# Patient Record
Sex: MALE | Race: WHITE | NOT HISPANIC OR LATINO | Employment: FULL TIME | URBAN - METROPOLITAN AREA
[De-identification: names, ages, dates, MRNs, and addresses within clinical notes are randomized per-mention and may not be internally consistent; named-entity substitution may affect disease eponyms.]

---

## 2021-05-25 ENCOUNTER — RECORDS - HEALTHEAST (OUTPATIENT)
Dept: ADMINISTRATIVE | Facility: CLINIC | Age: 36
End: 2021-05-25

## 2021-05-26 ENCOUNTER — RECORDS - HEALTHEAST (OUTPATIENT)
Dept: ADMINISTRATIVE | Facility: CLINIC | Age: 36
End: 2021-05-26

## 2021-05-28 ENCOUNTER — COMMUNICATION - HEALTHEAST (OUTPATIENT)
Dept: SCHEDULING | Facility: CLINIC | Age: 36
End: 2021-05-28

## 2022-06-13 ENCOUNTER — HOSPITAL ENCOUNTER (EMERGENCY)
Facility: CLINIC | Age: 37
Discharge: HOME | End: 2022-06-13
Attending: EMERGENCY MEDICINE | Admitting: EMERGENCY MEDICINE
Payer: COMMERCIAL

## 2022-06-13 VITALS
BODY MASS INDEX: 25.77 KG/M2 | HEART RATE: 74 BPM | RESPIRATION RATE: 20 BRPM | DIASTOLIC BLOOD PRESSURE: 76 MMHG | HEIGHT: 70 IN | WEIGHT: 180 LBS | SYSTOLIC BLOOD PRESSURE: 121 MMHG | OXYGEN SATURATION: 99 % | TEMPERATURE: 97.4 F

## 2022-06-13 DIAGNOSIS — K51.911 ULCERATIVE COLITIS WITH RECTAL BLEEDING, UNSPECIFIED LOCATION (H): ICD-10-CM

## 2022-06-13 DIAGNOSIS — K62.5 RECTAL BLEEDING: ICD-10-CM

## 2022-06-13 LAB
ABO/RH(D): ABNORMAL
ANION GAP SERPL CALCULATED.3IONS-SCNC: 10 MMOL/L (ref 5–18)
ANTIBODY SCREEN: POSITIVE
ANTIBODY UNIDENTIFIED: NORMAL
BASOPHILS # BLD AUTO: 0.1 10E3/UL (ref 0–0.2)
BASOPHILS NFR BLD AUTO: 1 %
BUN SERPL-MCNC: 14 MG/DL (ref 8–22)
CALCIUM SERPL-MCNC: 9.5 MG/DL (ref 8.5–10.5)
CHLORIDE BLD-SCNC: 104 MMOL/L (ref 98–107)
CO2 SERPL-SCNC: 25 MMOL/L (ref 22–31)
CREAT SERPL-MCNC: 1.34 MG/DL (ref 0.7–1.3)
EOSINOPHIL # BLD AUTO: 0.3 10E3/UL (ref 0–0.7)
EOSINOPHIL NFR BLD AUTO: 2 %
ERYTHROCYTE [DISTWIDTH] IN BLOOD BY AUTOMATED COUNT: 12.7 % (ref 10–15)
GFR SERPL CREATININE-BSD FRML MDRD: 71 ML/MIN/1.73M2
GLUCOSE BLD-MCNC: 97 MG/DL (ref 70–125)
HCT VFR BLD AUTO: 46 % (ref 40–53)
HGB BLD-MCNC: 15.5 G/DL (ref 13.3–17.7)
IMM GRANULOCYTES # BLD: 0.1 10E3/UL
IMM GRANULOCYTES NFR BLD: 1 %
INR PPP: 1.12 (ref 0.85–1.15)
LYMPHOCYTES # BLD AUTO: 2.2 10E3/UL (ref 0.8–5.3)
LYMPHOCYTES NFR BLD AUTO: 13 %
MCH RBC QN AUTO: 27.1 PG (ref 26.5–33)
MCHC RBC AUTO-ENTMCNC: 33.7 G/DL (ref 31.5–36.5)
MCV RBC AUTO: 80 FL (ref 78–100)
MONOCYTES # BLD AUTO: 1.4 10E3/UL (ref 0–1.3)
MONOCYTES NFR BLD AUTO: 8 %
NEUTROPHILS # BLD AUTO: 13.4 10E3/UL (ref 1.6–8.3)
NEUTROPHILS NFR BLD AUTO: 75 %
NRBC # BLD AUTO: 0 10E3/UL
NRBC BLD AUTO-RTO: 0 /100
PLATELET # BLD AUTO: 367 10E3/UL (ref 150–450)
POTASSIUM BLD-SCNC: 3.9 MMOL/L (ref 3.5–5)
RBC # BLD AUTO: 5.73 10E6/UL (ref 4.4–5.9)
SODIUM SERPL-SCNC: 139 MMOL/L (ref 136–145)
SPECIMEN EXPIRATION DATE: ABNORMAL
SPECIMEN EXPIRATION DATE: NORMAL
WBC # BLD AUTO: 17.5 10E3/UL (ref 4–11)

## 2022-06-13 PROCEDURE — 86870 RBC ANTIBODY IDENTIFICATION: CPT | Performed by: EMERGENCY MEDICINE

## 2022-06-13 PROCEDURE — 99283 EMERGENCY DEPT VISIT LOW MDM: CPT

## 2022-06-13 PROCEDURE — 86920 COMPATIBILITY TEST SPIN: CPT | Performed by: EMERGENCY MEDICINE

## 2022-06-13 PROCEDURE — 84999 UNLISTED CHEMISTRY PROCEDURE: CPT | Performed by: EMERGENCY MEDICINE

## 2022-06-13 PROCEDURE — 86922 COMPATIBILITY TEST ANTIGLOB: CPT | Performed by: EMERGENCY MEDICINE

## 2022-06-13 PROCEDURE — 85610 PROTHROMBIN TIME: CPT | Performed by: EMERGENCY MEDICINE

## 2022-06-13 PROCEDURE — 81403 MOPATH PROCEDURE LEVEL 4: CPT | Performed by: EMERGENCY MEDICINE

## 2022-06-13 PROCEDURE — 86901 BLOOD TYPING SEROLOGIC RH(D): CPT | Performed by: EMERGENCY MEDICINE

## 2022-06-13 PROCEDURE — 80048 BASIC METABOLIC PNL TOTAL CA: CPT | Performed by: EMERGENCY MEDICINE

## 2022-06-13 PROCEDURE — 86921 COMPATIBILITY TEST INCUBATE: CPT | Performed by: EMERGENCY MEDICINE

## 2022-06-13 PROCEDURE — 85025 COMPLETE CBC W/AUTO DIFF WBC: CPT | Performed by: EMERGENCY MEDICINE

## 2022-06-13 PROCEDURE — 84999 UNLISTED CHEMISTRY PROCEDURE: CPT | Mod: 59 | Performed by: EMERGENCY MEDICINE

## 2022-06-13 PROCEDURE — 86860 RBC ANTIBODY ELUTION: CPT | Performed by: EMERGENCY MEDICINE

## 2022-06-13 PROCEDURE — 86880 COOMBS TEST DIRECT: CPT | Performed by: EMERGENCY MEDICINE

## 2022-06-13 PROCEDURE — 82310 ASSAY OF CALCIUM: CPT | Performed by: EMERGENCY MEDICINE

## 2022-06-13 PROCEDURE — 36415 COLL VENOUS BLD VENIPUNCTURE: CPT | Performed by: EMERGENCY MEDICINE

## 2022-06-13 PROCEDURE — 86850 RBC ANTIBODY SCREEN: CPT | Performed by: EMERGENCY MEDICINE

## 2022-06-13 PROCEDURE — 86900 BLOOD TYPING SEROLOGIC ABO: CPT | Mod: 59 | Performed by: EMERGENCY MEDICINE

## 2022-06-13 NOTE — DISCHARGE INSTRUCTIONS
Continue to monitor your stools for the next 24 hours.  If the bleeding worsens or continues call your GI specialist to discuss further treatment for your ulcerative colitis.  Return prior to that if you have any significant fevers, worsening abdominal pain or uncontrolled bleeding.

## 2022-06-13 NOTE — ED PROVIDER NOTES
EMERGENCY DEPARTMENT ENCOUNTER      NAME: Michael Mo  AGE: 36 year old male  YOB: 1985  MRN: [id number]  EVALUATION DATE & TIME: 6/13/2022  4:54 PM    PCP: No primary care provider on file.    ED PROVIDER: Jm Cordova M.D.      Chief Complaint   Patient presents with     Rectal Bleeding         FINAL IMPRESSION:  Rectal bleeding      ED COURSE & MEDICAL DECISION MAKING:    Pertinent Labs & Imaging studies reviewed. (See chart for details)  36 year old male presents to the Emergency Department for evaluation of rectal bleeding.  Patient reports moderately bloody stool earlier today but none for the last few hours since arrival in the emergency room.  Patient with a history of ulcerative colitis.  Denies any recent discomfort or flaring of his symptoms.  Exam is benign.  Abdomen soft and nontender.  Vital signs normal.  Laboratory evaluation obtained from triage is normal.  Patient could be experiencing minimal flare of his ulcerative colitis.  Certainly no evidence of significant pathology presently.  Patient encouraged to remain on his present medications follow-up with his GI specialist if he continues to have some GI bleeding.  Understands to return immediately for any significant bleeding, abdominal pain or fevers with to suggest significant pathology.  Patient appears non toxic with stable vitals signs. Overall exam is benign.        5:22 PM I met with the patient for the initial interview and physical examination. Discussed plan for treatment and workup in the ED.      At the conclusion of the encounter I discussed the results of all of the tests and the disposition. The questions were answered and return precautions provided. The patient or family acknowledged understanding and was agreeable with the care plan.       PPE: Provider wore gloves, N95 mask, eye protection, surgical cap.     MEDICATIONS GIVEN IN THE EMERGENCY:  Medications - No data to display    NEW PRESCRIPTIONS STARTED AT  "TODAY'S ER VISIT  New Prescriptions    No medications on file          =================================================================    HPI    Patient information was obtained from: Patient    Use of Intrepreter: N/A       Michael Mo is a 36 year old male with a pertient medical history of rectal bleeding who presents to the ED for evaluation of rectal bleeding.     Per chart review: On 6/11/2022 at the [facility], patient presented with a skin lesion on his rectum. Patient has had a pilonidal cyst in the past which was drained in 2016. Has a history of ulcerative colitis, but doesn't believe his symptoms are related to that. Patient has a pilonidal cyst without surrounding cellulitis. Incision and drainage preformed. Recommended a course of doxycycline and warm compresses. Discussed that today's procedure is not treatment for this cyst and he will need surgical follow up to have it removed. Discharged with prescription for doxycycline.     On 6/11/2022 (~2 days ago), patient was seen for a pilonidal cyst where they prescribed him doxycycline. Shortly after taking the medication patient started experiencing \"bleeding/mucus/pus\" in his stool, so he stopped taking it this morning. Patient endorses abdominal tenderness since Saturday. States currently he is having no abdominal tenderness, and no bleeding.     Of note, patient's GI did a colonoscopy and a biopsy and diagnosed patient with ulcerative colitis. Patient was placed on 800 mg mesalamine, three in the morning, and three in the evening.     Patient denies any other complaints at this time.     REVIEW OF SYSTEMS   Constitutional:  Denies fever, chills  Respiratory:  Denies productive cough or increased work of breathing  Cardiovascular:  Denies chest pain, palpitations  GI:  Denies nausea, vomiting, or change in bladder habits. Positive for blood/mucus/pus in stool (resolved). Positive for abdominal tenderness (resolved).   Musculoskeletal:  " "Denies any new muscle/joint swelling  Skin:  Denies rash   Neurologic:  Denies focal weakness  All systems negative except as marked.     PAST MEDICAL HISTORY:  History reviewed. No pertinent past medical history.    PAST SURGICAL HISTORY:  History reviewed. No pertinent surgical history.      CURRENT MEDICATIONS:    No current facility-administered medications for this encounter.  No current outpatient medications on file.    ALLERGIES:  No Known Allergies    FAMILY HISTORY:  History reviewed. No pertinent family history.    SOCIAL HISTORY:   Social History     Socioeconomic History     Marital status: Single     Spouse name: None     Number of children: None     Years of education: None     Highest education level: None       VITALS:  Patient Vitals for the past 24 hrs:   BP Temp Temp src Pulse Resp SpO2 Height Weight   06/13/22 1715 119/73 -- -- 73 -- 98 % -- --   06/13/22 1700 129/86 -- -- 74 -- 98 % -- --   06/13/22 1330 135/81 97.4  F (36.3  C) Oral 80 20 99 % 1.778 m (5' 10\") 81.6 kg (180 lb)        PHYSICAL EXAM    Constitutional:  Awake, alert, in no apparent distress  HENT:  Normocephalic, Atraumatic. Bilateral external ears normal. Oropharynx moist. Nose normal. Neck- Normal range of motion with no guarding, No midline cervical tenderness, Supple, No stridor.   Eyes:  PERRL, EOMI with no signs of entrapment, Conjunctiva normal, No discharge.   Respiratory:  Normal breath sounds, No respiratory distress, No wheezing.    Cardiovascular:  Normal heart rate, Normal rhythm, No appreciable rubs or gallops.   GI:  Soft, No tenderness, No distension, No palpable masses. Hyperactive bowel sounds.   Musculoskeletal:   No edema. Good range of motion in all major joints. No tenderness to palpation or major deformities noted.  Integument:  Warm, Dry, No erythema, No rash.   Neurologic:  Alert & oriented, Normal motor function, Normal sensory function, No focal deficits noted.   Psychiatric:  Affect normal, Judgment " normal, Mood normal.     LAB:  All pertinent labs reviewed and interpreted.  Results for orders placed or performed during the hospital encounter of 06/13/22   Result Value Ref Range    INR 1.12 0.85 - 1.15   Basic metabolic panel   Result Value Ref Range    Sodium 139 136 - 145 mmol/L    Potassium 3.9 3.5 - 5.0 mmol/L    Chloride 104 98 - 107 mmol/L    Carbon Dioxide (CO2) 25 22 - 31 mmol/L    Anion Gap 10 5 - 18 mmol/L    Urea Nitrogen 14 8 - 22 mg/dL    Creatinine 1.34 (H) 0.70 - 1.30 mg/dL    Calcium 9.5 8.5 - 10.5 mg/dL    Glucose 97 70 - 125 mg/dL    GFR Estimate 71 >60 mL/min/1.73m2   CBC with platelets and differential   Result Value Ref Range    WBC Count 17.5 (H) 4.0 - 11.0 10e3/uL    RBC Count 5.73 4.40 - 5.90 10e6/uL    Hemoglobin 15.5 13.3 - 17.7 g/dL    Hematocrit 46.0 40.0 - 53.0 %    MCV 80 78 - 100 fL    MCH 27.1 26.5 - 33.0 pg    MCHC 33.7 31.5 - 36.5 g/dL    RDW 12.7 10.0 - 15.0 %    Platelet Count 367 150 - 450 10e3/uL    % Neutrophils 75 %    % Lymphocytes 13 %    % Monocytes 8 %    % Eosinophils 2 %    % Basophils 1 %    % Immature Granulocytes 1 %    NRBCs per 100 WBC 0 <1 /100    Absolute Neutrophils 13.4 (H) 1.6 - 8.3 10e3/uL    Absolute Lymphocytes 2.2 0.8 - 5.3 10e3/uL    Absolute Monocytes 1.4 (H) 0.0 - 1.3 10e3/uL    Absolute Eosinophils 0.3 0.0 - 0.7 10e3/uL    Absolute Basophils 0.1 0.0 - 0.2 10e3/uL    Absolute Immature Granulocytes 0.1 <=0.4 10e3/uL    Absolute NRBCs 0.0 10e3/uL   Adult Type and Screen   Result Value Ref Range    ABO/RH(D) B POS     Antibody Screen Positive (A) Negative    SPECIMEN EXPIRATION DATE [id number]    Antibody identification   Result Value Ref Range    ANTIBODY UNIDENTIFIED REFERRED FOR I.D.     SPECIMEN EXPIRATION DATE [id number]        RADIOLOGY:  Reviewed all pertinent imaging. Please see official radiology report.  No orders to display         I, Jackie Marie, am serving as a scribe to document services personally performed by Jm Cordova MD,  based on my observation and the provider's statements to me. I, Jm Cordova MD attest that Jackie Romanokorski is acting in a scribe capacity, has observed my performance of the services and has documented them in accordance with my direction.    Jm Cordova M.D.  Emergency Medicine  [facility] EMERGENCY ROOM     Jm Cordova MD  06/13/22 3682       Jm Cordova MD  06/14/22 0574

## 2022-06-14 ENCOUNTER — HOSPITAL ENCOUNTER (EMERGENCY)
Facility: CLINIC | Age: 37
Discharge: HOME | End: 2022-06-15
Attending: STUDENT IN AN ORGANIZED HEALTH CARE EDUCATION/TRAINING PROGRAM | Admitting: STUDENT IN AN ORGANIZED HEALTH CARE EDUCATION/TRAINING PROGRAM
Payer: COMMERCIAL

## 2022-06-14 DIAGNOSIS — K62.5 RECTAL BLEEDING: ICD-10-CM

## 2022-06-14 LAB
ALBUMIN SERPL-MCNC: 3.6 G/DL (ref 3.5–5)
ALP SERPL-CCNC: 113 U/L (ref 45–120)
ALT SERPL W P-5'-P-CCNC: 15 U/L (ref 0–45)
ANION GAP SERPL CALCULATED.3IONS-SCNC: 8 MMOL/L (ref 5–18)
AST SERPL W P-5'-P-CCNC: 18 U/L (ref 0–40)
BASOPHILS # BLD AUTO: 0.1 10E3/UL (ref 0–0.2)
BASOPHILS NFR BLD AUTO: 1 %
BILIRUB DIRECT SERPL-MCNC: 0.1 MG/DL
BILIRUB SERPL-MCNC: 0.4 MG/DL (ref 0–1)
BUN SERPL-MCNC: 14 MG/DL (ref 8–22)
CALCIUM SERPL-MCNC: 8.9 MG/DL (ref 8.5–10.5)
CHLORIDE BLD-SCNC: 106 MMOL/L (ref 98–107)
CO2 SERPL-SCNC: 24 MMOL/L (ref 22–31)
CREAT SERPL-MCNC: 1.27 MG/DL (ref 0.7–1.3)
EOSINOPHIL # BLD AUTO: 0.7 10E3/UL (ref 0–0.7)
EOSINOPHIL NFR BLD AUTO: 4 %
ERYTHROCYTE [DISTWIDTH] IN BLOOD BY AUTOMATED COUNT: 12.5 % (ref 10–15)
GFR SERPL CREATININE-BSD FRML MDRD: 76 ML/MIN/1.73M2
GLUCOSE BLD-MCNC: 98 MG/DL (ref 70–125)
HCT VFR BLD AUTO: 45.3 % (ref 40–53)
HGB BLD-MCNC: 15.1 G/DL (ref 13.3–17.7)
HOLD SPECIMEN: NORMAL
IMM GRANULOCYTES # BLD: 0.1 10E3/UL
IMM GRANULOCYTES NFR BLD: 1 %
LYMPHOCYTES # BLD AUTO: 2.8 10E3/UL (ref 0.8–5.3)
LYMPHOCYTES NFR BLD AUTO: 15 %
MCH RBC QN AUTO: 26.5 PG (ref 26.5–33)
MCHC RBC AUTO-ENTMCNC: 33.3 G/DL (ref 31.5–36.5)
MCV RBC AUTO: 80 FL (ref 78–100)
MONOCYTES # BLD AUTO: 1.5 10E3/UL (ref 0–1.3)
MONOCYTES NFR BLD AUTO: 8 %
NEUTROPHILS # BLD AUTO: 13.9 10E3/UL (ref 1.6–8.3)
NEUTROPHILS NFR BLD AUTO: 71 %
NRBC # BLD AUTO: 0 10E3/UL
NRBC BLD AUTO-RTO: 0 /100
PLATELET # BLD AUTO: 361 10E3/UL (ref 150–450)
POTASSIUM BLD-SCNC: 3.7 MMOL/L (ref 3.5–5)
PROT SERPL-MCNC: 7.7 G/DL (ref 6–8)
RBC # BLD AUTO: 5.69 10E6/UL (ref 4.4–5.9)
SODIUM SERPL-SCNC: 138 MMOL/L (ref 136–145)
WBC # BLD AUTO: 19 10E3/UL (ref 4–11)

## 2022-06-14 PROCEDURE — 80048 BASIC METABOLIC PNL TOTAL CA: CPT | Performed by: EMERGENCY MEDICINE

## 2022-06-14 PROCEDURE — 99283 EMERGENCY DEPT VISIT LOW MDM: CPT

## 2022-06-14 PROCEDURE — 82248 BILIRUBIN DIRECT: CPT | Performed by: EMERGENCY MEDICINE

## 2022-06-14 PROCEDURE — 85025 COMPLETE CBC W/AUTO DIFF WBC: CPT | Performed by: EMERGENCY MEDICINE

## 2022-06-14 PROCEDURE — 36415 COLL VENOUS BLD VENIPUNCTURE: CPT | Performed by: EMERGENCY MEDICINE

## 2022-06-15 VITALS
WEIGHT: 180 LBS | SYSTOLIC BLOOD PRESSURE: 131 MMHG | RESPIRATION RATE: 18 BRPM | DIASTOLIC BLOOD PRESSURE: 70 MMHG | HEART RATE: 90 BPM | HEIGHT: 70 IN | OXYGEN SATURATION: 99 % | BODY MASS INDEX: 25.77 KG/M2 | TEMPERATURE: 98.5 F

## 2022-06-15 PROCEDURE — 250N000012 HC RX MED GY IP 250 OP 636 PS 637: Performed by: STUDENT IN AN ORGANIZED HEALTH CARE EDUCATION/TRAINING PROGRAM

## 2022-06-15 PROCEDURE — 258N000003 HC RX IP 258 OP 636: Performed by: STUDENT IN AN ORGANIZED HEALTH CARE EDUCATION/TRAINING PROGRAM

## 2022-06-15 RX ORDER — PREDNISONE 20 MG/1
40 TABLET ORAL ONCE
Status: COMPLETED | OUTPATIENT
Start: 2022-06-15 | End: 2022-06-15

## 2022-06-15 RX ORDER — PREDNISONE 20 MG/1
TABLET ORAL
Qty: 30 TABLET | Refills: 0 | Status: SHIPPED | OUTPATIENT
Start: 2022-06-15 | End: 2022-07-09

## 2022-06-15 RX ADMIN — SODIUM CHLORIDE, POTASSIUM CHLORIDE, SODIUM LACTATE AND CALCIUM CHLORIDE 1000 ML: 600; 310; 30; 20 INJECTION, SOLUTION INTRAVENOUS at 00:25

## 2022-06-15 RX ADMIN — PREDNISONE 40 MG: 20 TABLET ORAL at 00:26

## 2022-06-15 NOTE — DISCHARGE INSTRUCTIONS
As we discussed, please call your GI doctor for follow-up appointment.  Please take the first 2 weeks of the steroids.  Return for any lightheadedness or dizziness, difficulty tolerating eating or drinking, increasing abdominal pain, fevers or any other concerning symptoms.

## 2022-06-15 NOTE — ED PROVIDER NOTES
Emergency Department Encounter         FINAL IMPRESSION:  Rectal bleeding        ED COURSE AND MEDICAL DECISION MAKING          36-year-old history of ulcerative colitis here with less than 12 hours of rectal bleeding with concerns of a possible colitis flare.  Has only had 1 other flare in his life that was treated successfully with oral glucocorticoids.  No fevers, chills, nausea or vomiting.  No chest pain or trouble breathing.  Normal urination.    Arrival his vitals are stable.  Also clinically.  Heart and lungs normal.  No significant abdominal pain to palpation.    - Labs x-ray showing white count.  No fever here.  No significant pain to palpation which would go against any abscess or rupture process.  - We will give him fluids as he states he feels dry peer after fluids we will give him a oral dose of prednisone and discharged home with a prednisone taper.  - Patient sent home with prednisone taper.  Has an appointment GI next week.                             At the conclusion of the encounter I discussed the results of all the tests and the disposition. The questions were answered. The patient or family acknowledged understanding and was agreeable with the care plan.                  MEDICATIONS GIVEN IN THE EMERGENCY DEPARTMENT:  Medications   lactated ringers BOLUS 1,000 mL (has no administration in time range)   predniSONE (DELTASONE) tablet 40 mg (has no administration in time range)       NEW PRESCRIPTIONS STARTED AT TODAY'S ED VISIT:  New Prescriptions    No medications on file       HPI     36 with history of ulcerative colitis here with 4 hours of rectal bleeding and diarrhea.  Mild abdominal cramping with no pain.  No nausea vomiting fevers or chest pain.  No trouble breathing.      REVIEW OF SYSTEMS:  Review of Systems   Constitutional: Negative for fever, malaise  HEENT: Negative runny nose, sore throat, ear pain, neck pain  Respiratory: Negative for shortness of breath, cough,  "congestion  Cardiovascular: Negative for chest pain, leg edema  Gastrointestinal: Diarrhea, hematochezia  Genitourinary: Negative for dysuria and hematuria.   Integument: Negative for rash, skin breakdown  Neurological: Negative for paresthesias, weakness, headache.  Musculoskeletal: Negative for joint pain, joint swelling      All other systems reviewed and are negative.          MEDICAL HISTORY     No past medical history on file.    No past surgical history on file.         No current outpatient medications on file.          PHYSICAL EXAM     BP (!) 124/92   Pulse 90   Temp 98.5  F (36.9  C) (Oral)   Resp 18   Ht 1.778 m (5' 10\")   Wt 81.6 kg (180 lb)   SpO2 96%   BMI 25.83 kg/m        PHYSICAL EXAM:     General: Patient appears well, nontoxic, comfortable  HEENT: Moist mucous membranes, no tongue swelling.  No head trauma.  No midline neck pain.  Cardiovascular: Normal rate, normal rhythm, no extremity edema.  No appreciable murmur.  Respiratory: No signs of respiratory distress, lungs are clear to auscultation bilaterally with no wheezes rhonchi or rales.  Abdominal: Soft, nontender, nondistended, no palpable masses, no guarding, no rebound  Musculoskeletal: Full range of motion of joints, no deformities appreciated.  Neurological: Alert and oriented, grossly neurologically intact.  Psychological: Normal affect and mood.  Integument: No rashes appreciated          RESULTS       Labs Ordered and Resulted from Time of ED Arrival to Time of ED Departure   CBC WITH PLATELETS AND DIFFERENTIAL - Abnormal       Result Value    WBC Count 19.0 (*)     RBC Count 5.69      Hemoglobin 15.1      Hematocrit 45.3      MCV 80      MCH 26.5      MCHC 33.3      RDW 12.5      Platelet Count 361      % Neutrophils 71      % Lymphocytes 15      % Monocytes 8      % Eosinophils 4      % Basophils 1      % Immature Granulocytes 1      NRBCs per 100 WBC 0      Absolute Neutrophils 13.9 (*)     Absolute Lymphocytes 2.8      " Absolute Monocytes 1.5 (*)     Absolute Eosinophils 0.7      Absolute Basophils 0.1      Absolute Immature Granulocytes 0.1      Absolute NRBCs 0.0     BASIC METABOLIC PANEL - Normal    Sodium 138      Potassium 3.7      Chloride 106      Carbon Dioxide (CO2) 24      Anion Gap 8      Urea Nitrogen 14      Creatinine 1.27      Calcium 8.9      Glucose 98      GFR Estimate 76     HEPATIC FUNCTION PANEL - Normal    Bilirubin Total 0.4      Bilirubin Direct 0.1      Protein Total 7.7      Albumin 3.6      Alkaline Phosphatase 113      AST 18      ALT 15         No orders to display                     PROCEDURES:  Procedures:  Procedures       Calvin Deluca DO  Emergency Medicine  [facility] EMERGENCY ROOM     Calvin Deluca DO  06/15/22 0127

## 2022-06-18 LAB
Lab: NORMAL
PERFORMING LABORATORY: NORMAL
SCANNED LAB RESULT: NORMAL
TEST NAME: NORMAL

## 2022-10-01 ENCOUNTER — HEALTH MAINTENANCE LETTER (OUTPATIENT)
Age: 37
End: 2022-10-01

## 2023-07-08 ENCOUNTER — HOSPITAL ENCOUNTER (EMERGENCY)
Facility: CLINIC | Age: 38
Discharge: HOME | End: 2023-07-08
Attending: EMERGENCY MEDICINE | Admitting: EMERGENCY MEDICINE
Payer: COMMERCIAL

## 2023-07-08 ENCOUNTER — APPOINTMENT (OUTPATIENT)
Dept: CT IMAGING | Facility: CLINIC | Age: 38
End: 2023-07-08
Attending: EMERGENCY MEDICINE
Payer: COMMERCIAL

## 2023-07-08 VITALS
SYSTOLIC BLOOD PRESSURE: 111 MMHG | HEIGHT: 70 IN | WEIGHT: 175 LBS | OXYGEN SATURATION: 100 % | BODY MASS INDEX: 25.05 KG/M2 | DIASTOLIC BLOOD PRESSURE: 66 MMHG | HEART RATE: 76 BPM | RESPIRATION RATE: 18 BRPM

## 2023-07-08 DIAGNOSIS — K52.9 NON-SPECIFIC COLITIS: ICD-10-CM

## 2023-07-08 LAB
ALBUMIN SERPL-MCNC: 3.8 G/DL (ref 3.5–5)
ALP SERPL-CCNC: 76 U/L (ref 45–120)
ALT SERPL W P-5'-P-CCNC: 12 U/L (ref 0–45)
ANION GAP SERPL CALCULATED.3IONS-SCNC: 8 MMOL/L (ref 5–18)
AST SERPL W P-5'-P-CCNC: 17 U/L (ref 0–40)
BASOPHILS # BLD AUTO: 0.1 10E3/UL (ref 0–0.2)
BASOPHILS NFR BLD AUTO: 1 %
BILIRUB SERPL-MCNC: 0.5 MG/DL (ref 0–1)
BUN SERPL-MCNC: 15 MG/DL (ref 8–22)
C COLI+JEJUNI+LARI FUSA STL QL NAA+PROBE: NOT DETECTED
C DIFF TOX B STL QL: NEGATIVE
CALCIUM SERPL-MCNC: 9.3 MG/DL (ref 8.5–10.5)
CHLORIDE BLD-SCNC: 105 MMOL/L (ref 98–107)
CO2 SERPL-SCNC: 27 MMOL/L (ref 22–31)
CREAT SERPL-MCNC: 1.19 MG/DL (ref 0.7–1.3)
EC STX1 GENE STL QL NAA+PROBE: NOT DETECTED
EC STX2 GENE STL QL NAA+PROBE: NOT DETECTED
EOSINOPHIL # BLD AUTO: 0.7 10E3/UL (ref 0–0.7)
EOSINOPHIL NFR BLD AUTO: 7 %
ERYTHROCYTE [DISTWIDTH] IN BLOOD BY AUTOMATED COUNT: 14 % (ref 10–15)
GFR SERPL CREATININE-BSD FRML MDRD: 81 ML/MIN/1.73M2
GLUCOSE BLD-MCNC: 89 MG/DL (ref 70–125)
HCT VFR BLD AUTO: 44.4 % (ref 40–53)
HGB BLD-MCNC: 14.2 G/DL (ref 13.3–17.7)
IMM GRANULOCYTES # BLD: 0.1 10E3/UL
IMM GRANULOCYTES NFR BLD: 1 %
LIPASE SERPL-CCNC: 70 U/L (ref 0–52)
LYMPHOCYTES # BLD AUTO: 2.8 10E3/UL (ref 0.8–5.3)
LYMPHOCYTES NFR BLD AUTO: 29 %
MCH RBC QN AUTO: 26 PG (ref 26.5–33)
MCHC RBC AUTO-ENTMCNC: 32 G/DL (ref 31.5–36.5)
MCV RBC AUTO: 81 FL (ref 78–100)
MONOCYTES # BLD AUTO: 0.9 10E3/UL (ref 0–1.3)
MONOCYTES NFR BLD AUTO: 9 %
NEUTROPHILS # BLD AUTO: 5.1 10E3/UL (ref 1.6–8.3)
NEUTROPHILS NFR BLD AUTO: 53 %
NOROV GI+II ORF1-ORF2 JNC STL QL NAA+PR: NOT DETECTED
NRBC # BLD AUTO: 0 10E3/UL
NRBC BLD AUTO-RTO: 0 /100
PLATELET # BLD AUTO: 346 10E3/UL (ref 150–450)
POTASSIUM BLD-SCNC: 4.1 MMOL/L (ref 3.5–5)
PROT SERPL-MCNC: 7.5 G/DL (ref 6–8)
RBC # BLD AUTO: 5.46 10E6/UL (ref 4.4–5.9)
RVA NSP5 STL QL NAA+PROBE: NOT DETECTED
SALMONELLA SP RPOD STL QL NAA+PROBE: NOT DETECTED
SHIGELLA SP+EIEC IPAH STL QL NAA+PROBE: NOT DETECTED
SODIUM SERPL-SCNC: 140 MMOL/L (ref 136–145)
V CHOL+PARA RFBL+TRKH+TNAA STL QL NAA+PR: NOT DETECTED
WBC # BLD AUTO: 9.5 10E3/UL (ref 4–11)
Y ENTERO RECN STL QL NAA+PROBE: NOT DETECTED

## 2023-07-08 PROCEDURE — 96360 HYDRATION IV INFUSION INIT: CPT | Mod: 59

## 2023-07-08 PROCEDURE — 250N000011 HC RX IP 250 OP 636: Mod: JZ | Performed by: EMERGENCY MEDICINE

## 2023-07-08 PROCEDURE — 87506 IADNA-DNA/RNA PROBE TQ 6-11: CPT | Performed by: EMERGENCY MEDICINE

## 2023-07-08 PROCEDURE — 87493 C DIFF AMPLIFIED PROBE: CPT | Mod: XU | Performed by: EMERGENCY MEDICINE

## 2023-07-08 PROCEDURE — 74177 CT ABD & PELVIS W/CONTRAST: CPT

## 2023-07-08 PROCEDURE — 99285 EMERGENCY DEPT VISIT HI MDM: CPT | Mod: 25

## 2023-07-08 PROCEDURE — 85025 COMPLETE CBC W/AUTO DIFF WBC: CPT | Performed by: EMERGENCY MEDICINE

## 2023-07-08 PROCEDURE — 80053 COMPREHEN METABOLIC PANEL: CPT | Performed by: EMERGENCY MEDICINE

## 2023-07-08 PROCEDURE — 83690 ASSAY OF LIPASE: CPT | Performed by: EMERGENCY MEDICINE

## 2023-07-08 PROCEDURE — 36415 COLL VENOUS BLD VENIPUNCTURE: CPT | Performed by: EMERGENCY MEDICINE

## 2023-07-08 PROCEDURE — 96361 HYDRATE IV INFUSION ADD-ON: CPT

## 2023-07-08 PROCEDURE — 258N000003 HC RX IP 258 OP 636: Performed by: EMERGENCY MEDICINE

## 2023-07-08 RX ORDER — IOPAMIDOL 755 MG/ML
90 INJECTION, SOLUTION INTRAVASCULAR ONCE
Status: COMPLETED | OUTPATIENT
Start: 2023-07-08 | End: 2023-07-08

## 2023-07-08 RX ORDER — ONDANSETRON 4 MG/1
4 TABLET, ORALLY DISINTEGRATING ORAL EVERY 8 HOURS PRN
Qty: 20 TABLET | Refills: 0 | Status: ON HOLD | OUTPATIENT
Start: 2023-07-08 | End: 2023-10-08

## 2023-07-08 RX ADMIN — IOPAMIDOL 90 ML: 755 INJECTION, SOLUTION INTRAVENOUS at 08:40

## 2023-07-08 RX ADMIN — SODIUM CHLORIDE 1000 ML: 9 INJECTION, SOLUTION INTRAVENOUS at 07:37

## 2023-07-08 ASSESSMENT — ENCOUNTER SYMPTOMS
VOMITING: 1
DIARRHEA: 1
ABDOMINAL PAIN: 1
NAUSEA: 1

## 2023-07-08 ASSESSMENT — ACTIVITIES OF DAILY LIVING (ADL)
ADLS_ACUITY_SCORE: 35

## 2023-07-08 NOTE — ED TRIAGE NOTES
"Was diagnosed with C Diff 2 months ago and was told \"if vomiting happens go see a doctor\". Patient reports vomiting 3x this morning and also having back pain that is also a new symptom.     Reports nausea, diarrhea due to c diff, and abdominal cramping, has taken vancomycin for this in the past and takes probiotics daily. Hx ulcerative colitis.     Triage Assessment     Row Name 07/08/23 0648       Triage Assessment (Adult)    Airway WDL WDL       Respiratory WDL    Respiratory WDL WDL       Skin Circulation/Temperature WDL    Skin Circulation/Temperature WDL WDL       Cardiac WDL    Cardiac WDL WDL       Peripheral/Neurovascular WDL    Peripheral Neurovascular WDL WDL       Cognitive/Neuro/Behavioral WDL    Cognitive/Neuro/Behavioral WDL WDL              "

## 2023-07-08 NOTE — DISCHARGE INSTRUCTIONS
As we discussed [facility] recommend you call their clinic on Monday.  Your stool testing is pending.  We will call you if any treatments are indicated.  Recommend Zofran every 8 hours as needed for vomiting and nausea.  Drink plenty of fluids.  Return to the ER for fever, worsening pain, large amount of blood in the stool or other concerns

## 2023-07-08 NOTE — ED PROVIDER NOTES
EMERGENCY DEPARTMENT ENCOUNTER      NAME: Michael Mo  AGE: 37 year old male  YOB: 1985  MRN: [id number]  EVALUATION DATE & TIME: 7/8/2023  6:41 AM    PCP: Palomo Dillard    ED PROVIDER: Oleg Villeda MD        Chief Complaint   Patient presents with     Nausea & Vomiting         FINAL IMPRESSION:  1. Non-specific colitis          ED COURSE & MEDICAL DECISION MAKING:    Pertinent Labs & Imaging studies reviewed. (See chart for details)  37 year old male presents to the Emergency Department for evaluation of nonbloody diarrhea, periumbilical abdominal pain, and a few episodes of vomiting      He finished vancomycin for C. difficile.  Is on Humira and balsalamine for ulcerative colitis      We will obtain CT imaging to evaluate for colitis or bowel obstruction or volvulus    We will obtain lipase and CMP and CBC and stool cultures and C. difficile    ED Course as of 07/08/23 1029   Sat Jul 08, 2023   0652 Reviewed external records from 12/7/2022 where he had a sigmoidoscopy done by Dr. Penn that showedPreliminary Plan:   Recommendation Comments:     -Follow-up biopsy results.   -Try switching from balsalazide back to mesalamine (a new prescription was sent to your pharmacy).   -Continue prednisone taper as prescribed.   -Try mesalamine suppositories nightly. You can use these instead of or in addition to enemas.   -Follow-up for a virtual visit as scheduled with Dr. Bull on 12/14/22 (appointment time 7:30 AM).   Pathology Results:   A: RECTUM, BIOPSY:           1. Markedly active chronic colitis consistent with ulcerative colitis           2. Negative for dysplasia           3. CMV immunohistochemistry is negative       1028 WBC: 9.5   1028 Hemoglobin: 14.2   1028 Platelet Count: 346   1029 Creatinine: 1.19   1029 Lipase(!): 70     CT shows colitis of sigmoid and descending colon.     7:35 AM  I met the patient and performed my initial interview and exam.  10:10 AM I updated the  patient.  10:22 AM I spoke to [facility]. Recommends no treatment until cultures come back.    No current treatments are indicated per GI.  Patient is well-appearing on examination.  Given Zofran here.  Plan for discharge home with Zofran.  Patient will call his GI doctor on Monday.  He return to the ER for worsening symptoms      Medical Decision Making    History:    Supplemental history from: N/A    External Record(s) reviewed: Documented in chart, if applicable.    Work Up:    Chart documentation includes differential considered and any EKGs or imaging independently interpreted by provider, where specified.    In additional to work up documented, I considered the following work up: Documented in chart, if applicable.    External consultation:    Discussion of management with another provider: Documented in chart, if applicable    Complicating factors:    Care impacted by chronic illness: Other: colitis    Care affected by social determinants of health: N/A    Disposition considerations: Discharge. I prescribed additional prescription strength medication(s) as charted. N/A.          Voice recognition software was used in the creation of this note. Any grammatical or nonsensical errors are due to inherent errors with the software and are not the intention of the writer.         At the conclusion of the encounter I discussed the results of all of the tests and the disposition. The questions were answered. The patient or family acknowledged understanding and was agreeable with the care plan.               MEDICATIONS GIVEN IN THE EMERGENCY:  Medications   0.9% sodium chloride BOLUS (0 mLs Intravenous Stopped 7/8/23 0946)   iopamidol (ISOVUE-370) solution 90 mL (90 mLs Intravenous $Given 7/8/23 0840)       NEW PRESCRIPTIONS STARTED AT TODAY'S ER VISIT  New Prescriptions    ONDANSETRON (ZOFRAN ODT) 4 MG ODT TAB    Take 1 tablet (4 mg) by mouth every 8 hours as needed for nausea       "    =================================================================    HPI    Triage note  \" \"      Patient information was obtained from: patient     Use of : N/A       Michael Mo is a 37 year old male with a pertinent history of colitis who presents to this ED by walk in for evaluation of nausea and vomiting.    The patient was diagnosed with C.diff 2 months ago. He was on a 2 week treatment with antibiotics and has had diarrhea since finishing his antibiotics. The patient went in for a C. diff test 2 weeks ago but has not received any results from that test.     Today (7/8/2023), the patient reports nausea, abdominal pain, diarrhea and 3 episodes of vomiting. He describes the emesis as bile and yellow-colored. He did not vomit when he had C. diff 2 months ago. He has had between 3-10 bowel movements since his nausea began. The patient rates his overall pain a 3 or 4 out of 10. He denies any gallbladder/pancreas problems, abdominal surgeries, and urinary symptoms.    REVIEW OF SYSTEMS   Review of Systems   Gastrointestinal: Positive for abdominal pain, diarrhea, nausea and vomiting.        PAST MEDICAL HISTORY:  History reviewed. No pertinent past medical history.    PAST SURGICAL HISTORY:  History reviewed. No pertinent surgical history.        CURRENT MEDICATIONS:    ondansetron (ZOFRAN ODT) 4 MG ODT tab        ALLERGIES:  Allergies   Allergen Reactions     Blood-Group Specific Substance      Weak warm autoantibody identified. Expect possible delay in blood for transfusion. Draw at least 2 large lavender tops for type and screen requests.        FAMILY HISTORY:  History reviewed. No pertinent family history.    SOCIAL HISTORY:   Social History     Socioeconomic History     Marital status: Single       VITALS:  /60   Pulse 76   Resp 18   Ht 1.778 m (5' 10\")   Wt 79.4 kg (175 lb)   SpO2 100%   BMI 25.11 kg/m      PHYSICAL EXAM      Vitals: /60   Pulse 76   Resp 18   Ht 1.778 m " "(5' 10\")   Wt 79.4 kg (175 lb)   SpO2 100%   BMI 25.11 kg/m    General: Appears in no acute distress, awake, alert, interactive.  Eyes: Conjunctivae non-injected. Sclera anicteric.  HENT: Atraumatic.  Neck: Supple.  Respiratory/Chest: Respiration unlabored.  Abdomen: non distended. Soft, non-tender.  Musculoskeletal: Normal extremities. No edema or erythema.  Skin: Normal color. No rash or diaphoresis.  Neurologic: Face symmetric, moves all extremities spontaneously. Speech clear.  Psychiatric: Oriented to person, place, and time. Affect appropriate.       LAB:  All pertinent labs reviewed and interpreted.  Results for orders placed or performed during the hospital encounter of 07/08/23   CT Abdomen Pelvis w Contrast    Impression    IMPRESSION:   1.  Descending and sigmoid colitis, correlate for infectious and inflammatory etiologies such as ulcerative colitis.   Comprehensive metabolic panel   Result Value Ref Range    Sodium 140 136 - 145 mmol/L    Potassium 4.1 3.5 - 5.0 mmol/L    Chloride 105 98 - 107 mmol/L    Carbon Dioxide (CO2) 27 22 - 31 mmol/L    Anion Gap 8 5 - 18 mmol/L    Urea Nitrogen 15 8 - 22 mg/dL    Creatinine 1.19 0.70 - 1.30 mg/dL    Calcium 9.3 8.5 - 10.5 mg/dL    Glucose 89 70 - 125 mg/dL    Alkaline Phosphatase 76 45 - 120 U/L    AST 17 0 - 40 U/L    ALT 12 0 - 45 U/L    Protein Total 7.5 6.0 - 8.0 g/dL    Albumin 3.8 3.5 - 5.0 g/dL    Bilirubin Total 0.5 0.0 - 1.0 mg/dL    GFR Estimate 81 >60 mL/min/1.73m2   Result Value Ref Range    Lipase 70 (H) 0 - 52 U/L   CBC with platelets and differential   Result Value Ref Range    WBC Count 9.5 4.0 - 11.0 10e3/uL    RBC Count 5.46 4.40 - 5.90 10e6/uL    Hemoglobin 14.2 13.3 - 17.7 g/dL    Hematocrit 44.4 40.0 - 53.0 %    MCV 81 78 - 100 fL    MCH 26.0 (L) 26.5 - 33.0 pg    MCHC 32.0 31.5 - 36.5 g/dL    RDW 14.0 10.0 - 15.0 %    Platelet Count 346 150 - 450 10e3/uL    % Neutrophils 53 %    % Lymphocytes 29 %    % Monocytes 9 %    % Eosinophils 7 % "    % Basophils 1 %    % Immature Granulocytes 1 %    NRBCs per 100 WBC 0 <1 /100    Absolute Neutrophils 5.1 1.6 - 8.3 10e3/uL    Absolute Lymphocytes 2.8 0.8 - 5.3 10e3/uL    Absolute Monocytes 0.9 0.0 - 1.3 10e3/uL    Absolute Eosinophils 0.7 0.0 - 0.7 10e3/uL    Absolute Basophils 0.1 0.0 - 0.2 10e3/uL    Absolute Immature Granulocytes 0.1 <=0.4 10e3/uL    Absolute NRBCs 0.0 10e3/uL       RADIOLOGY:  Reviewed all pertinent imaging. Please see official radiology report.  CT Abdomen Pelvis w Contrast   Final Result   IMPRESSION:    1.  Descending and sigmoid colitis, correlate for infectious and inflammatory etiologies such as ulcerative colitis.          PROCEDURES:         I, Anna Schafer, am serving as a scribe to document services personally performed by Lalito Villeda MD based on my observation and the provider's statements to me. I, Dr. Lalito Villeda, attest that Anna Schafer is acting in a scribe capacity, has observed my performance of the services and has documented them in accordance with my direction.    Lalito Villdea MD  Emergency Medicine  [facility] EMERGENCY ROOM  [address] [facility] [address] [phone]     Lalito Villeda MD  07/08/23 8483

## 2023-10-08 ENCOUNTER — HOSPITAL ENCOUNTER (INPATIENT)
Facility: CLINIC | Age: 38
LOS: 8 days | Discharge: HOME | DRG: 385 | End: 2023-10-16
Attending: EMERGENCY MEDICINE | Admitting: INTERNAL MEDICINE
Payer: COMMERCIAL

## 2023-10-08 ENCOUNTER — APPOINTMENT (OUTPATIENT)
Dept: CT IMAGING | Facility: CLINIC | Age: 38
DRG: 385 | End: 2023-10-08
Attending: EMERGENCY MEDICINE
Payer: COMMERCIAL

## 2023-10-08 DIAGNOSIS — R53.1 WEAKNESS: ICD-10-CM

## 2023-10-08 DIAGNOSIS — R53.83 OTHER FATIGUE: ICD-10-CM

## 2023-10-08 DIAGNOSIS — R19.7 DIARRHEA, UNSPECIFIED TYPE: Primary | ICD-10-CM

## 2023-10-08 DIAGNOSIS — K51.911 ULCERATIVE COLITIS WITH RECTAL BLEEDING, UNSPECIFIED LOCATION (H): ICD-10-CM

## 2023-10-08 DIAGNOSIS — A04.72 C. DIFFICILE COLITIS: ICD-10-CM

## 2023-10-08 DIAGNOSIS — R11.0 NAUSEA: ICD-10-CM

## 2023-10-08 LAB
ADV 40+41 DNA STL QL NAA+NON-PROBE: NEGATIVE
ALBUMIN SERPL BCG-MCNC: 3 G/DL (ref 3.5–5.2)
ALBUMIN SERPL BCG-MCNC: 3.4 G/DL (ref 3.5–5.2)
ALBUMIN UR-MCNC: 10 MG/DL
ALP SERPL-CCNC: 71 U/L (ref 40–129)
ALP SERPL-CCNC: 82 U/L (ref 40–129)
ALT SERPL W P-5'-P-CCNC: 5 U/L (ref 0–70)
ALT SERPL W P-5'-P-CCNC: 6 U/L (ref 0–70)
ANION GAP SERPL CALCULATED.3IONS-SCNC: 14 MMOL/L (ref 7–15)
ANION GAP SERPL CALCULATED.3IONS-SCNC: 9 MMOL/L (ref 7–15)
APPEARANCE UR: CLEAR
AST SERPL W P-5'-P-CCNC: 18 U/L (ref 0–45)
AST SERPL W P-5'-P-CCNC: 22 U/L (ref 0–45)
ASTRO TYP 1-8 RNA STL QL NAA+NON-PROBE: NEGATIVE
BASO+EOS+MONOS # BLD AUTO: ABNORMAL 10*3/UL
BASO+EOS+MONOS # BLD AUTO: ABNORMAL 10*3/UL
BASO+EOS+MONOS NFR BLD AUTO: ABNORMAL %
BASO+EOS+MONOS NFR BLD AUTO: ABNORMAL %
BASOPHILS # BLD AUTO: 0 10E3/UL (ref 0–0.2)
BASOPHILS # BLD AUTO: ABNORMAL 10*3/UL
BASOPHILS # BLD MANUAL: 0 10E3/UL (ref 0–0.2)
BASOPHILS NFR BLD AUTO: 0 %
BASOPHILS NFR BLD AUTO: ABNORMAL %
BASOPHILS NFR BLD MANUAL: 0 %
BILIRUB SERPL-MCNC: 0.3 MG/DL
BILIRUB SERPL-MCNC: 0.4 MG/DL
BILIRUB UR QL STRIP: NEGATIVE
BUN SERPL-MCNC: 6.3 MG/DL (ref 6–20)
BUN SERPL-MCNC: 7.4 MG/DL (ref 6–20)
C CAYETANENSIS DNA STL QL NAA+NON-PROBE: NEGATIVE
C DIFF TOX B STL QL: NEGATIVE
CALCIUM SERPL-MCNC: 8 MG/DL (ref 8.6–10)
CALCIUM SERPL-MCNC: 9 MG/DL (ref 8.6–10)
CAMPYLOBACTER DNA SPEC NAA+PROBE: NEGATIVE
CHLORIDE SERPL-SCNC: 101 MMOL/L (ref 98–107)
CHLORIDE SERPL-SCNC: 103 MMOL/L (ref 98–107)
CK SERPL-CCNC: 137 U/L (ref 39–308)
COLOR UR AUTO: ABNORMAL
CREAT SERPL-MCNC: 1.18 MG/DL (ref 0.67–1.17)
CREAT SERPL-MCNC: 1.25 MG/DL (ref 0.67–1.17)
CRP SERPL-MCNC: 57.1 MG/L
CRYPTOSP DNA STL QL NAA+NON-PROBE: NEGATIVE
DEPRECATED HCO3 PLAS-SCNC: 24 MMOL/L (ref 22–29)
DEPRECATED HCO3 PLAS-SCNC: 26 MMOL/L (ref 22–29)
E COLI O157 DNA STL QL NAA+NON-PROBE: NORMAL
E HISTOLYT DNA STL QL NAA+NON-PROBE: NEGATIVE
EAEC ASTA GENE ISLT QL NAA+PROBE: NEGATIVE
EC STX1+STX2 GENES STL QL NAA+NON-PROBE: NEGATIVE
EGFRCR SERPLBLD CKD-EPI 2021: 77 ML/MIN/1.73M2
EGFRCR SERPLBLD CKD-EPI 2021: 82 ML/MIN/1.73M2
EOSINOPHIL # BLD AUTO: 0 10E3/UL (ref 0–0.7)
EOSINOPHIL # BLD AUTO: ABNORMAL 10*3/UL
EOSINOPHIL # BLD MANUAL: 0 10E3/UL (ref 0–0.7)
EOSINOPHIL NFR BLD AUTO: 0 %
EOSINOPHIL NFR BLD AUTO: ABNORMAL %
EOSINOPHIL NFR BLD MANUAL: 0 %
EPEC EAE GENE STL QL NAA+NON-PROBE: NEGATIVE
ERYTHROCYTE [DISTWIDTH] IN BLOOD BY AUTOMATED COUNT: 14.3 % (ref 10–15)
ERYTHROCYTE [DISTWIDTH] IN BLOOD BY AUTOMATED COUNT: 14.4 % (ref 10–15)
ETEC LTA+ST1A+ST1B TOX ST NAA+NON-PROBE: NEGATIVE
G LAMBLIA DNA STL QL NAA+NON-PROBE: NEGATIVE
GLUCOSE SERPL-MCNC: 128 MG/DL (ref 70–99)
GLUCOSE SERPL-MCNC: 96 MG/DL (ref 70–99)
GLUCOSE UR STRIP-MCNC: NEGATIVE MG/DL
HCT VFR BLD AUTO: 38.2 % (ref 40–53)
HCT VFR BLD AUTO: 42.4 % (ref 40–53)
HGB BLD-MCNC: 12.4 G/DL (ref 13.3–17.7)
HGB BLD-MCNC: 14 G/DL (ref 13.3–17.7)
HGB UR QL STRIP: NEGATIVE
IMM GRANULOCYTES # BLD: 0.1 10E3/UL
IMM GRANULOCYTES # BLD: ABNORMAL 10*3/UL
IMM GRANULOCYTES NFR BLD: 1 %
IMM GRANULOCYTES NFR BLD: ABNORMAL %
KETONES UR STRIP-MCNC: 40 MG/DL
LACTATE SERPL-SCNC: 1 MMOL/L (ref 0.7–2)
LEUKOCYTE ESTERASE UR QL STRIP: NEGATIVE
LIPASE SERPL-CCNC: 23 U/L (ref 13–60)
LYMPHOCYTES # BLD AUTO: 0.8 10E3/UL (ref 0.8–5.3)
LYMPHOCYTES # BLD AUTO: ABNORMAL 10*3/UL
LYMPHOCYTES # BLD MANUAL: 2.8 10E3/UL (ref 0.8–5.3)
LYMPHOCYTES NFR BLD AUTO: 4 %
LYMPHOCYTES NFR BLD AUTO: ABNORMAL %
LYMPHOCYTES NFR BLD MANUAL: 14 %
MAGNESIUM SERPL-MCNC: 1.9 MG/DL (ref 1.7–2.3)
MAGNESIUM SERPL-MCNC: 1.9 MG/DL (ref 1.7–2.3)
MCH RBC QN AUTO: 25.9 PG (ref 26.5–33)
MCH RBC QN AUTO: 26.1 PG (ref 26.5–33)
MCHC RBC AUTO-ENTMCNC: 32.5 G/DL (ref 31.5–36.5)
MCHC RBC AUTO-ENTMCNC: 33 G/DL (ref 31.5–36.5)
MCV RBC AUTO: 79 FL (ref 78–100)
MCV RBC AUTO: 80 FL (ref 78–100)
MONOCYTES # BLD AUTO: 0.1 10E3/UL (ref 0–1.3)
MONOCYTES # BLD AUTO: ABNORMAL 10*3/UL
MONOCYTES # BLD MANUAL: 1 10E3/UL (ref 0–1.3)
MONOCYTES NFR BLD AUTO: 1 %
MONOCYTES NFR BLD AUTO: ABNORMAL %
MONOCYTES NFR BLD MANUAL: 5 %
MUCOUS THREADS #/AREA URNS LPF: PRESENT /LPF
NEUTROPHILS # BLD AUTO: 17.9 10E3/UL (ref 1.6–8.3)
NEUTROPHILS # BLD AUTO: ABNORMAL 10*3/UL
NEUTROPHILS # BLD MANUAL: 16.2 10E3/UL (ref 1.6–8.3)
NEUTROPHILS NFR BLD AUTO: 94 %
NEUTROPHILS NFR BLD AUTO: ABNORMAL %
NEUTROPHILS NFR BLD MANUAL: 81 %
NITRATE UR QL: NEGATIVE
NOROVIRUS GI+II RNA STL QL NAA+NON-PROBE: NEGATIVE
NRBC # BLD AUTO: 0 10E3/UL
NRBC # BLD AUTO: 0 10E3/UL
NRBC BLD AUTO-RTO: 0 /100
NRBC BLD AUTO-RTO: 0 /100
P SHIGELLOIDES DNA STL QL NAA+NON-PROBE: NEGATIVE
PH UR STRIP: 6 [PH] (ref 5–7)
PLAT MORPH BLD: ABNORMAL
PLATELET # BLD AUTO: 393 10E3/UL (ref 150–450)
PLATELET # BLD AUTO: 443 10E3/UL (ref 150–450)
POTASSIUM SERPL-SCNC: 3.5 MMOL/L (ref 3.4–5.3)
POTASSIUM SERPL-SCNC: 4 MMOL/L (ref 3.4–5.3)
PROT SERPL-MCNC: 6.4 G/DL (ref 6.4–8.3)
PROT SERPL-MCNC: 7.2 G/DL (ref 6.4–8.3)
RBC # BLD AUTO: 4.79 10E6/UL (ref 4.4–5.9)
RBC # BLD AUTO: 5.37 10E6/UL (ref 4.4–5.9)
RBC MORPH BLD: ABNORMAL
RBC URINE: <1 /HPF
RVA RNA STL QL NAA+NON-PROBE: NEGATIVE
SALMONELLA SP RPOD STL QL NAA+PROBE: NEGATIVE
SAPO I+II+IV+V RNA STL QL NAA+NON-PROBE: NEGATIVE
SHIGELLA SP+EIEC IPAH ST NAA+NON-PROBE: NEGATIVE
SODIUM SERPL-SCNC: 138 MMOL/L (ref 135–145)
SODIUM SERPL-SCNC: 139 MMOL/L (ref 135–145)
SP GR UR STRIP: <1.005 (ref 1–1.03)
UROBILINOGEN UR STRIP-MCNC: <2 MG/DL
V CHOLERAE DNA SPEC QL NAA+PROBE: NEGATIVE
VIBRIO DNA SPEC NAA+PROBE: NEGATIVE
WBC # BLD AUTO: 19 10E3/UL (ref 4–11)
WBC # BLD AUTO: 20 10E3/UL (ref 4–11)
WBC URINE: <1 /HPF
Y ENTEROCOL DNA STL QL NAA+PROBE: NEGATIVE

## 2023-10-08 PROCEDURE — 74177 CT ABD & PELVIS W/CONTRAST: CPT

## 2023-10-08 PROCEDURE — 120N000001 HC R&B MED SURG/OB

## 2023-10-08 PROCEDURE — 258N000003 HC RX IP 258 OP 636: Performed by: EMERGENCY MEDICINE

## 2023-10-08 PROCEDURE — 81001 URINALYSIS AUTO W/SCOPE: CPT | Performed by: INTERNAL MEDICINE

## 2023-10-08 PROCEDURE — 99223 1ST HOSP IP/OBS HIGH 75: CPT | Performed by: INTERNAL MEDICINE

## 2023-10-08 PROCEDURE — 87040 BLOOD CULTURE FOR BACTERIA: CPT | Performed by: EMERGENCY MEDICINE

## 2023-10-08 PROCEDURE — 82550 ASSAY OF CK (CPK): CPT | Performed by: INTERNAL MEDICINE

## 2023-10-08 PROCEDURE — 250N000011 HC RX IP 250 OP 636: Performed by: EMERGENCY MEDICINE

## 2023-10-08 PROCEDURE — 84155 ASSAY OF PROTEIN SERUM: CPT | Performed by: INTERNAL MEDICINE

## 2023-10-08 PROCEDURE — 83605 ASSAY OF LACTIC ACID: CPT | Performed by: EMERGENCY MEDICINE

## 2023-10-08 PROCEDURE — 83735 ASSAY OF MAGNESIUM: CPT | Performed by: INTERNAL MEDICINE

## 2023-10-08 PROCEDURE — 36415 COLL VENOUS BLD VENIPUNCTURE: CPT | Performed by: INTERNAL MEDICINE

## 2023-10-08 PROCEDURE — 85007 BL SMEAR W/DIFF WBC COUNT: CPT | Performed by: EMERGENCY MEDICINE

## 2023-10-08 PROCEDURE — 85025 COMPLETE CBC W/AUTO DIFF WBC: CPT | Performed by: EMERGENCY MEDICINE

## 2023-10-08 PROCEDURE — 80053 COMPREHEN METABOLIC PANEL: CPT | Performed by: EMERGENCY MEDICINE

## 2023-10-08 PROCEDURE — 250N000011 HC RX IP 250 OP 636: Performed by: PHYSICIAN ASSISTANT

## 2023-10-08 PROCEDURE — 96365 THER/PROPH/DIAG IV INF INIT: CPT | Mod: 59

## 2023-10-08 PROCEDURE — 99285 EMERGENCY DEPT VISIT HI MDM: CPT | Mod: 25

## 2023-10-08 PROCEDURE — 83690 ASSAY OF LIPASE: CPT | Performed by: EMERGENCY MEDICINE

## 2023-10-08 PROCEDURE — 36415 COLL VENOUS BLD VENIPUNCTURE: CPT | Performed by: EMERGENCY MEDICINE

## 2023-10-08 PROCEDURE — 250N000013 HC RX MED GY IP 250 OP 250 PS 637: Performed by: PHYSICIAN ASSISTANT

## 2023-10-08 PROCEDURE — 250N000011 HC RX IP 250 OP 636: Mod: JZ | Performed by: STUDENT IN AN ORGANIZED HEALTH CARE EDUCATION/TRAINING PROGRAM

## 2023-10-08 PROCEDURE — 96375 TX/PRO/DX INJ NEW DRUG ADDON: CPT

## 2023-10-08 PROCEDURE — 96361 HYDRATE IV INFUSION ADD-ON: CPT

## 2023-10-08 PROCEDURE — 258N000003 HC RX IP 258 OP 636: Performed by: INTERNAL MEDICINE

## 2023-10-08 PROCEDURE — 86140 C-REACTIVE PROTEIN: CPT | Performed by: EMERGENCY MEDICINE

## 2023-10-08 PROCEDURE — 87493 C DIFF AMPLIFIED PROBE: CPT | Performed by: EMERGENCY MEDICINE

## 2023-10-08 PROCEDURE — 85027 COMPLETE CBC AUTOMATED: CPT | Performed by: INTERNAL MEDICINE

## 2023-10-08 RX ORDER — MAGNESIUM SULFATE HEPTAHYDRATE 40 MG/ML
2 INJECTION, SOLUTION INTRAVENOUS ONCE
Status: COMPLETED | OUTPATIENT
Start: 2023-10-08 | End: 2023-10-08

## 2023-10-08 RX ORDER — ONDANSETRON 2 MG/ML
4 INJECTION INTRAMUSCULAR; INTRAVENOUS ONCE
Status: COMPLETED | OUTPATIENT
Start: 2023-10-08 | End: 2023-10-08

## 2023-10-08 RX ORDER — ACETAMINOPHEN 650 MG/1
650 SUPPOSITORY RECTAL EVERY 6 HOURS PRN
Status: DISCONTINUED | OUTPATIENT
Start: 2023-10-08 | End: 2023-10-16 | Stop reason: HOSPADM

## 2023-10-08 RX ORDER — ERGOCALCIFEROL 1.25 MG/1
50000 CAPSULE, LIQUID FILLED ORAL WEEKLY
COMMUNITY
Start: 2023-01-24

## 2023-10-08 RX ORDER — SODIUM CHLORIDE 9 MG/ML
INJECTION, SOLUTION INTRAVENOUS CONTINUOUS
Status: DISCONTINUED | OUTPATIENT
Start: 2023-10-08 | End: 2023-10-12

## 2023-10-08 RX ORDER — BALSALAZIDE DISODIUM 750 MG/1
4 CAPSULE ORAL DAILY
Status: ON HOLD | COMMUNITY
End: 2023-10-16

## 2023-10-08 RX ORDER — HYDROCODONE BITARTRATE AND ACETAMINOPHEN 5; 325 MG/1; MG/1
1 TABLET ORAL EVERY 4 HOURS PRN
Status: DISCONTINUED | OUTPATIENT
Start: 2023-10-08 | End: 2023-10-16 | Stop reason: HOSPADM

## 2023-10-08 RX ORDER — NALOXONE HYDROCHLORIDE 0.4 MG/ML
0.4 INJECTION, SOLUTION INTRAMUSCULAR; INTRAVENOUS; SUBCUTANEOUS
Status: DISCONTINUED | OUTPATIENT
Start: 2023-10-08 | End: 2023-10-16 | Stop reason: HOSPADM

## 2023-10-08 RX ORDER — VANCOMYCIN HYDROCHLORIDE 125 MG/1
125 CAPSULE ORAL 4 TIMES DAILY
Status: ON HOLD | COMMUNITY
Start: 2023-10-02 | End: 2023-10-16

## 2023-10-08 RX ORDER — NALOXONE HYDROCHLORIDE 0.4 MG/ML
0.2 INJECTION, SOLUTION INTRAMUSCULAR; INTRAVENOUS; SUBCUTANEOUS
Status: DISCONTINUED | OUTPATIENT
Start: 2023-10-08 | End: 2023-10-16 | Stop reason: HOSPADM

## 2023-10-08 RX ORDER — METHYLPREDNISOLONE SODIUM SUCCINATE 40 MG/ML
20 INJECTION, POWDER, LYOPHILIZED, FOR SOLUTION INTRAMUSCULAR; INTRAVENOUS EVERY 8 HOURS
Status: DISCONTINUED | OUTPATIENT
Start: 2023-10-08 | End: 2023-10-09

## 2023-10-08 RX ORDER — IOPAMIDOL 755 MG/ML
100 INJECTION, SOLUTION INTRAVASCULAR ONCE
Status: COMPLETED | OUTPATIENT
Start: 2023-10-08 | End: 2023-10-08

## 2023-10-08 RX ORDER — BALSALAZIDE DISODIUM 750 MG/1
5 CAPSULE ORAL EVERY EVENING
Status: ON HOLD | COMMUNITY
End: 2023-10-16

## 2023-10-08 RX ORDER — ACETAMINOPHEN 325 MG/1
650 TABLET ORAL EVERY 4 HOURS PRN
Status: DISCONTINUED | OUTPATIENT
Start: 2023-10-08 | End: 2023-10-16 | Stop reason: HOSPADM

## 2023-10-08 RX ORDER — METRONIDAZOLE 500 MG/100ML
500 INJECTION, SOLUTION INTRAVENOUS ONCE
Status: COMPLETED | OUTPATIENT
Start: 2023-10-08 | End: 2023-10-08

## 2023-10-08 RX ORDER — HYDROMORPHONE HCL IN WATER/PF 6 MG/30 ML
0.2 PATIENT CONTROLLED ANALGESIA SYRINGE INTRAVENOUS EVERY 4 HOURS PRN
Status: DISCONTINUED | OUTPATIENT
Start: 2023-10-08 | End: 2023-10-16 | Stop reason: HOSPADM

## 2023-10-08 RX ORDER — METHYLPREDNISOLONE SODIUM SUCCINATE 125 MG/2ML
125 INJECTION, POWDER, LYOPHILIZED, FOR SOLUTION INTRAMUSCULAR; INTRAVENOUS ONCE
Status: COMPLETED | OUTPATIENT
Start: 2023-10-08 | End: 2023-10-08

## 2023-10-08 RX ORDER — VANCOMYCIN HYDROCHLORIDE 125 MG/1
125 CAPSULE ORAL 4 TIMES DAILY
Status: DISCONTINUED | OUTPATIENT
Start: 2023-10-08 | End: 2023-10-13

## 2023-10-08 RX ADMIN — METHYLPREDNISOLONE SODIUM SUCCINATE 20 MG: 40 INJECTION, POWDER, FOR SOLUTION INTRAMUSCULAR; INTRAVENOUS at 20:41

## 2023-10-08 RX ADMIN — VANCOMYCIN HYDROCHLORIDE 125 MG: 125 CAPSULE ORAL at 18:41

## 2023-10-08 RX ADMIN — MAGNESIUM SULFATE HEPTAHYDRATE 2 G: 40 INJECTION, SOLUTION INTRAVENOUS at 14:22

## 2023-10-08 RX ADMIN — SODIUM CHLORIDE: 9 INJECTION, SOLUTION INTRAVENOUS at 22:30

## 2023-10-08 RX ADMIN — SODIUM CHLORIDE: 9 INJECTION, SOLUTION INTRAVENOUS at 13:36

## 2023-10-08 RX ADMIN — SODIUM CHLORIDE: 9 INJECTION, SOLUTION INTRAVENOUS at 05:53

## 2023-10-08 RX ADMIN — METHYLPREDNISOLONE SODIUM SUCCINATE 20 MG: 40 INJECTION, POWDER, FOR SOLUTION INTRAMUSCULAR; INTRAVENOUS at 12:52

## 2023-10-08 RX ADMIN — SODIUM CHLORIDE 1000 ML: 9 INJECTION, SOLUTION INTRAVENOUS at 02:42

## 2023-10-08 RX ADMIN — METHYLPREDNISOLONE SODIUM SUCCINATE 125 MG: 125 INJECTION, POWDER, FOR SOLUTION INTRAMUSCULAR; INTRAVENOUS at 02:48

## 2023-10-08 RX ADMIN — ONDANSETRON 4 MG: 2 INJECTION INTRAMUSCULAR; INTRAVENOUS at 02:41

## 2023-10-08 RX ADMIN — HYDROMORPHONE HYDROCHLORIDE 1 MG: 1 INJECTION, SOLUTION INTRAMUSCULAR; INTRAVENOUS; SUBCUTANEOUS at 02:42

## 2023-10-08 RX ADMIN — VANCOMYCIN HYDROCHLORIDE 125 MG: 125 CAPSULE ORAL at 12:51

## 2023-10-08 RX ADMIN — METRONIDAZOLE 500 MG: 500 INJECTION, SOLUTION INTRAVENOUS at 03:18

## 2023-10-08 RX ADMIN — VANCOMYCIN HYDROCHLORIDE 125 MG: 125 CAPSULE ORAL at 22:28

## 2023-10-08 RX ADMIN — IOPAMIDOL 100 ML: 755 INJECTION, SOLUTION INTRAVENOUS at 03:22

## 2023-10-08 ASSESSMENT — ENCOUNTER SYMPTOMS
DIARRHEA: 1
BLOOD IN STOOL: 1
FEVER: 1
FATIGUE: 1
ABDOMINAL PAIN: 1
NAUSEA: 1

## 2023-10-08 ASSESSMENT — ACTIVITIES OF DAILY LIVING (ADL)
ADLS_ACUITY_SCORE: 18
ADLS_ACUITY_SCORE: 18
ADLS_ACUITY_SCORE: 35
ADLS_ACUITY_SCORE: 18
ADLS_ACUITY_SCORE: 35
ADLS_ACUITY_SCORE: 18

## 2023-10-08 NOTE — ED PROVIDER NOTES
EMERGENCY DEPARTMENT ENCOUNTER      NAME: Michael Mo  AGE: 37 year old male  YOB: 1985  MRN: [id number]  EVALUATION DATE & TIME: 10/8/2023  2:21 AM    PCP: Palomo Dillard    ED PROVIDER: Brit Potter M.D.      Chief Complaint   Patient presents with    Abdominal Pain    Diarrhea     FINAL IMPRESSION:  1. Ulcerative colitis with rectal bleeding, unspecified location (H)    2. C. difficile colitis    3. Nausea    4. Weakness    5. Other fatigue        MEDICAL DECISION MAKING:    Pertinent Labs & Imaging studies reviewed. (See chart for details)  ED Course as of 10/08/23 0421   Sun Oct 08, 2023   0233 Afebrile.  Vital signs here with tachycardia.  Patient is coming in for evaluation of abdominal discomfort, diarrhea.  Bloody stools, occasional blood clots.  Says he was recently diagnosed with C. difficile infection this past week, was started on oral vancomycin but could not take it today secondary to the discomfort in his abdomen.  Has been nauseated but has not vomited.  Weak, fatigued.  Says that his last flare was 2 years ago but this seems worse.  Does take Humira   0247 Took his last dose of Humira this past Wednesday.  Follows with GI.    Exam for patient here slightly dry mucous membranes.  Tachycardic heart rate.  Abdomen mildly tender to palpation diffusely with some slight hyperactive bowel sounds.  No rebound or guarding.  Otherwise unremarkable.    Given the fact that patient is reporting greater than 6 stools a day, is tachycardic here meets criteria for treatment, we will start him on some Solu-Medrol.  Did not take his medications for the C. difficile today because of the abdominal upset and so we will give him a dose of IV vancomycin.  Get a get CT scan abdomen pelvis.  Check labs, fluids.   0411 Patient updated as to his blood work, CT scan results.  After fluids heart rate down to the 90s, he looks much more comfortable.  Pain is well controlled.  Spoke with Dr. Dillon from  [facility], went over patient's current presentation and he agrees with admission.  Agrees with the Solu-Medrol, metronidazole.  We will call and speak with hospitalist for admission.   0421 Spoke with hospitalist for admission.       Medical Decision Making    History:  Supplemental history from: Documented in chart, if applicable  External Record(s) reviewed: Documented in chart, if applicable.    Work Up:  Chart documentation includes differential considered and any EKGs or imaging independently interpreted by provider, where specified.  In additional to work up documented, I considered the following work up: Documented in chart, if applicable.    External consultation:  Discussion of management with another provider: Documented in chart, if applicable    Complicating factors:  Care impacted by chronic illness: N/A and Other: ulcerative colitis  Care affected by social determinants of health: N/A    Disposition considerations: Admit.    Critical care: 0 minutes excluding separately billable procedures.  Includes bedside management, time reviewing test results, review of records, discussing the case with staff, documenting the medical record and time spent with family members (or surrogate decision makers) discussing specific treatment issues.          ED COURSE:  2:23 AM I met with the patient, obtained history, performed an initial exam, and discussed options and plan for diagnostics and treatment here in the ED.   3:49 AM I spoke with Dr. Dillon from [facility].  4:10 AM I went to update the patient.    The importance of close follow up was discussed. We reviewed warning signs and symptoms, and I instructed Mr. Mo to return to the emergency department immediately if he develops any new or worsening symptoms. I provided additional verbal discharge instructions. Mr. Mo expressed understanding and agreement with this plan of care, his questions were answered, and he was discharged in stable condition.      MEDICATIONS GIVEN IN THE EMERGENCY:  Medications   sodium chloride 0.9% BOLUS 1,000 mL (1,000 mLs Intravenous $New Bag 10/8/23 0242)   HYDROmorphone (DILAUDID) injection 1 mg (1 mg Intravenous $Given 10/8/23 0242)   ondansetron (ZOFRAN) injection 4 mg (4 mg Intravenous $Given 10/8/23 0241)   methylPREDNISolone sodium succinate (solu-MEDROL) injection 125 mg (125 mg Intravenous $Given 10/8/23 0248)   metroNIDAZOLE (FLAGYL) infusion 500 mg (500 mg Intravenous $New Bag 10/8/23 0318)   iopamidol (ISOVUE-370) solution 100 mL (100 mLs Intravenous $Given 10/8/23 0322)       NEW PRESCRIPTIONS STARTED AT TODAY'S ER VISIT:  New Prescriptions    No medications on file          =================================================================    HPI    Patient information was obtained from: Patient    Use of : N/A     Michael Mo is a 37 year old male with a history of ulcerative colitis who presents with abdominal pain and diarrhea.    The patient reports here with severe generalized abdominal pain that started in the last day.  He also reports diarrhea and yellow stools.  He reports blood in his stool along with some clot passage.  He also is reporting fatigue and a low-grade fever.  He does note weight loss as of late.  He reports being diagnosed with C. Dif last week and was started on oral Vancomycin.  He has taken this aside from today due to his symptoms.  He has a history of ulcerative colitis, with last flare being about two years ago.  He takes Humera and Balsalazide. He also is reporting some nausea with dry-heaving.  He denies any other complaints a this time.        REVIEW OF SYSTEMS   Review of Systems   Constitutional:  Positive for fatigue and fever.   Gastrointestinal:  Positive for abdominal pain (generalized), blood in stool, diarrhea and nausea.        Positive for yellow stool   All other systems reviewed and are negative.    PAST MEDICAL HISTORY:  Ulcerative Colitis    PAST SURGICAL  HISTORY:  No past surgical history on file.    CURRENT MEDICATIONS:    No current facility-administered medications for this encounter.    Current Outpatient Medications:     ondansetron (ZOFRAN ODT) 4 MG ODT tab, Take 1 tablet (4 mg) by mouth every 8 hours as needed for nausea, Disp: 20 tablet, Rfl: 0    ALLERGIES:  Allergies   Allergen Reactions    Blood-Group Specific Substance      Weak warm autoantibody identified. Expect possible delay in blood for transfusion. Draw at least 2 large lavender tops for type and screen requests.        FAMILY HISTORY:  No family history on file.    SOCIAL HISTORY:   Social History     Socioeconomic History    Marital status: Single       PHYSICAL EXAM:    Vitals: /67   Pulse 94   Temp 97.5  F (36.4  C)   Resp 18   SpO2 96%    General:. Alert and interactive, comfortable appearing.  HENT: Oropharynx without erythema or exudates. patient here slightly dry mucous membranes .  TMs clear bilaterally.  Eyes: Pupils mid-sized and equally reactive.   Neck: Full AROM.  No midline tenderness to palpation.  Cardiovascular: Tachycardic, Regular rhythm. Peripheral pulses 2+ bilaterally.  Chest/Pulmonary: Normal work of breathing. Lung sounds clear and equal throughout, no wheezes or crackles. No chest wall tenderness or deformities.  Abdomen: Soft, nondistended. Abdomen mildly tender to palpation diffusely with some slight hyperactive bowel sounds. No rebound or guarding.   Back/Spine: No CVA or midline tenderness.  Extremities: Normal ROM of all major joints. No lower extremity edema.   Skin: Warm and dry. Normal skin color.   Neuro: Speech clear. CNs grossly intact. Moves all extremities appropriately. Strength and sensation grossly intact to all extremities.   Psych: Normal affect/mood, cooperative, memory appropriate.     LAB:  All pertinent labs reviewed and interpreted.  Labs Ordered and Resulted from Time of ED Arrival to Time of ED Departure   COMPREHENSIVE METABOLIC PANEL -  Abnormal       Result Value    Sodium 139      Potassium 3.5      Carbon Dioxide (CO2) 24      Anion Gap 14      Urea Nitrogen 7.4      Creatinine 1.25 (*)     GFR Estimate 77      Calcium 9.0      Chloride 101      Glucose 96      Alkaline Phosphatase 82      AST 22      ALT 6      Protein Total 7.2      Albumin 3.4 (*)     Bilirubin Total 0.4     CRP INFLAMMATION - Abnormal    CRP Inflammation 57.10 (*)    CBC WITH PLATELETS AND DIFFERENTIAL - Abnormal    WBC Count 20.0 (*)     RBC Count 5.37      Hemoglobin 14.0      Hematocrit 42.4      MCV 79      MCH 26.1 (*)     MCHC 33.0      RDW 14.4      Platelet Count 443      % Neutrophils        % Lymphocytes        % Monocytes        Mids % (Monos, Eos, Basos)        % Eosinophils        % Basophils        % Immature Granulocytes        NRBCs per 100 WBC 0      Absolute Neutrophils        Absolute Lymphocytes        Absolute Monocytes        Mids Abs (Monos, Eos, Basos)        Absolute Eosinophils        Absolute Basophils        Absolute Immature Granulocytes        Absolute NRBCs 0.0     DIFFERENTIAL - Abnormal    % Neutrophils 81      % Lymphocytes 14      % Monocytes 5      % Eosinophils 0      % Basophils 0      Absolute Neutrophils 16.2 (*)     Absolute Lymphocytes 2.8      Absolute Monocytes 1.0      Absolute Eosinophils 0.0      Absolute Basophils 0.0      RBC Morphology Confirmed RBC Indices      Platelet Assessment        Value: Automated Count Confirmed. Platelet morphology is normal.   LIPASE - Normal    Lipase 23     LACTIC ACID WHOLE BLOOD - Normal    Lactic Acid 1.0     C. DIFFICILE TOXIN B PCR WITH REFLEX TO C. DIFFICILE ANTIGEN AND TOXINS A/B EIA   BLOOD CULTURE   BLOOD CULTURE       RADIOLOGY:  CT Abdomen Pelvis w Contrast   Final Result   IMPRESSION:    1.  Mural thickening seen throughout the colon with relative sparing of the cecum, with pericolonic stranding noted, which may reflect inflammatory and/or infectious colitis given patient's history.  There is no evidence of colonic perforation, or    pericolonic abscess at this time.   2.  A few prominent loops of proximal jejunum with decompression gradually, favored reflect focal ileus and/or enteritis.   3.  Subcentimeter mesenteric lymphadenopathy, favored to be reactive in nature          EKG:  See MDM    PROCEDURES:   None    I, Surinder Maldonado, am serving as a scribe to document services personally performed by Dr. Brit Potter  based on my observation and the provider's statements to me. I, Brit Potter MD attest that Surinder Maldonado is acting in a scribe capacity, has observed my performance of the services and has documented them in accordance with my direction.      Brit Potter M.D.  Emergency Medicine  [facility] EMERGENCY ROOM  [address] [facility] [address]  [phone]  Dept: [phone]      Brit Potter MD  10/08/23 0421

## 2023-10-08 NOTE — PROGRESS NOTES
Care Management Follow Up    Length of Stay (days): 0    Expected Discharge Date: 10/10/2023     Concerns to be Addressed:       Patient plan of care discussed at interdisciplinary rounds: Yes    Anticipated Discharge Disposition: Home       Additional Information:  Chart reviewed, pt independent at baseline.  Anticipate home at discharge.  Care Management will be available for any discharge needs.    DEANDRE Mustafa

## 2023-10-08 NOTE — H&P
[facility] MEDICINE ADMISSION HISTORY AND PHYSICAL       Assessment & Plan      1. Abdominal pain, diarrhea and bloody stools - Hgb of 14    - recent diagnosis of C diff on vancomycin - 5 days worth so far, as he stopped yesterday  - could be Ulcerative colitis flare    - was given 125 mgs of solumedrol while in ED -- this is more than enough for one day. Will have GI dose steroid for the next day, not sure as well if its indicated  for UC or his GI symptoms are all C diff colitis related    - send stool cultures  - continue vancomycin  - CBC/CMP/procalcitonin  - IVF, NPO pain meds, anti emetics     2. Mild elev creatinine, consider renal failure  - check UA  - BMP      VTE prophylaxis --  SCDs,   Diet --  regular  Code Status -- Full,  Barriers to discharge -- Admitting/Acute medical condition/s  Discharge Disposition and goals --  Unable to determine at this point, pending progress/treatment response.     PPE - I was wearing PPE including but not limited to - N95 mask, Gloves, and/or Safety glasses.  Admission Status --  Inpatient     Care plan is based on available information and patient's condition at the time of encounter. This was discussed with the patient/family member. They agree to proceed. They were made aware that care plans may change.     For rounding day MD, I recommend to revise care plan and to review history if condition changed or new information came up. At the end of night shift, this case will be presented to the rounding MD. All or some of home medication/s were not resumed on admission due to safety reasons or contraindications. Dosing and frequency may also have been modified. Please resume/review them during your visit.     75 minutes spent by me on the date of service doing chart review, history, exam, diagnostic test results interpretation, documentation & further activities per the note.      Augustus Man MD, MPH, FACP, [facility]  Internal Medicine -  Hospitalist        Chief Complaint Diarrhea      HISTORY     - Patient is in ED - 6  - Per report, he is here for abdominal pain and diarrhea. He reported blood in the stools, some are clots. Had some low grade fevers, and vomited as well. He decided to come as he doesn't feel good.   - He has ulcerative colitis and on Humira. Sees [facility]  - He was recently diagnosed Cdiff and was placed on vancomycin last week. He had 5 days worth and stopped yesterday, with worsening abdominal pain.     - ED course - abdominal CT scan was done. He was Flagyl. He was also given solumedrol.  - WBC of 20K. And hgb of 14.     - ROS --- No headache. No dizziness. No weakness. No CP or SOB. No palpitations.  No urinary symptoms. No bleeding symptoms. No weight loss. Rest of 12 point ROS was reviewed and negative.       Past Medical History     No past medical history on file.      Surgical History     No past surgical history on file.     Family History      No family history on file.      Social History      .  Social History     Socioeconomic History    Marital status: Single     Spouse name: Not on file    Number of children: Not on file    Years of education: Not on file    Highest education level: Not on file   Occupational History    Not on file   Tobacco Use    Smoking status: Not on file    Smokeless tobacco: Not on file   Substance and Sexual Activity    Alcohol use: Not on file    Drug use: Not on file    Sexual activity: Not on file   Other Topics Concern    Not on file   Social History Narrative    Not on file     Social Determinants of Health     Financial Resource Strain: Not on file   Food Insecurity: Not on file   Transportation Needs: Not on file   Physical Activity: Not on file   Stress: Not on file   Social Connections: Not on file   Interpersonal Safety: Not on file   Housing Stability: Not on file          Allergies        Allergies   Allergen Reactions    Blood-Group Specific Substance      Weak warm autoantibody  identified. Expect possible delay in blood for transfusion. Draw at least 2 large lavender tops for type and screen requests.          Prior to Admission Medications      No current facility-administered medications on file prior to encounter.  ondansetron (ZOFRAN ODT) 4 MG ODT tab, Take 1 tablet (4 mg) by mouth every 8 hours as needed for nausea            Review of Systems     A 12 point comprehensive review of systems was negative except as noted above in HPI.    PHYSICAL EXAMINATION       Vitals      Vitals: /67   Pulse 94   Temp 97.5  F (36.4  C)   Resp 18   SpO2 96%   BMI= There is no height or weight on file to calculate BMI.      Examination     General Appearance:  Alert, cooperative, no distress  Head:    Normocephalic, without obvious abnormality, atraumatic  EENT:  PERRL, conjunctiva/corneas clear, EOM's intact.   Neck:   Supple, symmetrical, trachea midline, no adenopathy; no NVE  Back:  Symmetric, no curvature, no CVA tenderness  Chest/Lungs: Clear to auscultation bilaterally, respirations unlabored, No tenderness or deformity. No abdominal breathing or use of accessory muscles.   Heart:    Regular rate and rhythm, S1 and S2 normal, no murmur, rub   or gallop  Abdomen: Soft, non-tender, bowel sounds active all four quadrants, not peritoneal on palpation. Not distended  Extremities:  Extremities normal, atraumatic, no swelling   Skin:  Skin color, texture, turgor normal, no rashes or lesion  Neurologic:  Awake and alert,  No lateralizing or localizing signs          Pertinent Lab     Results for orders placed or performed during the hospital encounter of 10/08/23   CT Abdomen Pelvis w Contrast    Impression    IMPRESSION:   1.  Mural thickening seen throughout the colon with relative sparing of the cecum, with pericolonic stranding noted, which may reflect inflammatory and/or infectious colitis given patient's history. There is no evidence of colonic perforation, or   pericolonic abscess at  this time.  2.  A few prominent loops of proximal jejunum with decompression gradually, favored reflect focal ileus and/or enteritis.  3.  Subcentimeter mesenteric lymphadenopathy, favored to be reactive in nature   Comprehensive metabolic panel   Result Value Ref Range    Sodium 139 135 - 145 mmol/L    Potassium 3.5 3.4 - 5.3 mmol/L    Carbon Dioxide (CO2) 24 22 - 29 mmol/L    Anion Gap 14 7 - 15 mmol/L    Urea Nitrogen 7.4 6.0 - 20.0 mg/dL    Creatinine 1.25 (H) 0.67 - 1.17 mg/dL    GFR Estimate 77 >60 mL/min/1.73m2    Calcium 9.0 8.6 - 10.0 mg/dL    Chloride 101 98 - 107 mmol/L    Glucose 96 70 - 99 mg/dL    Alkaline Phosphatase 82 40 - 129 U/L    AST 22 0 - 45 U/L    ALT 6 0 - 70 U/L    Protein Total 7.2 6.4 - 8.3 g/dL    Albumin 3.4 (L) 3.5 - 5.2 g/dL    Bilirubin Total 0.4 <=1.2 mg/dL   Result Value Ref Range    Lipase 23 13 - 60 U/L   Result Value Ref Range    CRP Inflammation 57.10 (H) <5.00 mg/L   Lactic acid whole blood   Result Value Ref Range    Lactic Acid 1.0 0.7 - 2.0 mmol/L   CBC with platelets and differential   Result Value Ref Range    WBC Count 20.0 (H) 4.0 - 11.0 10e3/uL    RBC Count 5.37 4.40 - 5.90 10e6/uL    Hemoglobin 14.0 13.3 - 17.7 g/dL    Hematocrit 42.4 40.0 - 53.0 %    MCV 79 78 - 100 fL    MCH 26.1 (L) 26.5 - 33.0 pg    MCHC 33.0 31.5 - 36.5 g/dL    RDW 14.4 10.0 - 15.0 %    Platelet Count 443 150 - 450 10e3/uL    % Neutrophils      % Lymphocytes      % Monocytes      Mids % (Monos, Eos, Basos)      % Eosinophils      % Basophils      % Immature Granulocytes      NRBCs per 100 WBC 0 <1 /100    Absolute Neutrophils      Absolute Lymphocytes      Absolute Monocytes      Mids Abs (Monos, Eos, Basos)      Absolute Eosinophils      Absolute Basophils      Absolute Immature Granulocytes      Absolute NRBCs 0.0 10e3/uL   Manual Differential   Result Value Ref Range    % Neutrophils 81 %    % Lymphocytes 14 %    % Monocytes 5 %    % Eosinophils 0 %    % Basophils 0 %    Absolute Neutrophils  16.2 (H) 1.6 - 8.3 10e3/uL    Absolute Lymphocytes 2.8 0.8 - 5.3 10e3/uL    Absolute Monocytes 1.0 0.0 - 1.3 10e3/uL    Absolute Eosinophils 0.0 0.0 - 0.7 10e3/uL    Absolute Basophils 0.0 0.0 - 0.2 10e3/uL    RBC Morphology Confirmed RBC Indices     Platelet Assessment  Automated Count Confirmed. Platelet morphology is normal.     Automated Count Confirmed. Platelet morphology is normal.           Pertinent Radiology

## 2023-10-08 NOTE — PLAN OF CARE
Problem: Bowel Disease, Inflammatory (Ulcerative Colitis or Crohn's Disease)  Goal: Optimal Adaptation to Chronic Illness  Outcome: Progressing  Goal: Diarrhea Symptom Relief  Outcome: Progressing  Goal: Absence of Infection Signs and Symptoms  Outcome: Progressing  Intervention: Prevent or Manage Infection  Recent Flowsheet Documentation  Taken 10/8/2023 0740 by Ghazala Weiss RN  Isolation Precautions: enteric precautions maintained  Goal: Optimal Nutrition Delivery  Outcome: Progressing  Goal: Optimal Pain Control and Function  Outcome: Progressing  Intervention: Prevent or Manage Pain  Recent Flowsheet Documentation  Taken 10/8/2023 0740 by Ghazala Weiss RN  Pain Management Interventions: declines   Goal Outcome Evaluation:     No complaints of pain.  Stool was sent to check for c.diff.  Pt is alert and oriented and independent in the room.

## 2023-10-08 NOTE — PROGRESS NOTES
Pt complains of intermittent pain in the abdomen. C. Diff came back negative. Pt removed from enteric precautions. Started on a low fiber diet. Vital signs stable. Call bell with in reach.

## 2023-10-08 NOTE — PHARMACY-ADMISSION MEDICATION HISTORY
Pharmacist Admission Medication History    Admission medication history is complete. The information provided in this note is only as accurate as the sources available at the time of the update.    Medication reconciliation/reorder completed by provider prior to medication history? No    Information Source(s): Patient and CareEverywhere/SureScripts via in-person    Pertinent Information: patient reports starting vancomycin on Monday 10/2.    Changes made to PTA medication list:  Added: All medications are new to this encounter  Deleted: Zofran  Changed: None    Medication Affordability:       Allergies reviewed with patient and updates made in EHR: yes    Medications available for use during hospital stay: NONE.     Medication History Completed By: Noemí De Oliveira RPH 10/8/2023 8:41 AM    PTA Med List   Medication Sig Last Dose    balsalazide (COLAZAL) 750 MG capsule Take 4 capsules by mouth daily 10/7/2023 at am    balsalazide (COLAZAL) 750 MG capsule Take 5 capsules by mouth every evening 10/7/2023 at pm    vancomycin (VANCOCIN) 125 MG capsule Take 125 mg by mouth 4 times daily 10/7/2023 at am - started 10/2    vitamin D2 (ERGOCALCIFEROL) 29392 units (1250 mcg) capsule Take 50,000 Units by mouth once a week Unknown at am

## 2023-10-08 NOTE — CONSULTS
GASTROENTEROLOGY CONSULTATION      Michael Mo  [address] [place] [address]  37 year old male     Admission Date/Time: 10/8/2023  Primary Care Provider: Palomo Dillard  Referring / Attending Physician: Dr. Man     We were asked to see the patient in consultation by Dr. Man for evaluation of UC and C.difficile .        HPI:  Michael Mo is a 37 year old male with medical history of chronic pan ulcerative colitis (diagnosed in July 2021) and history of C. difficile in April 2023, who presents to the emergency room with worsening abdominal pain and diarrhea despite 5 days of treatment with oral vancomycin for C. difficile.    Patient follows with [facility].  He was seen at the end of September with symptoms of ongoing abdominal pain and diarrhea.  Patient is currently on weekly Humira.  He notes he has had difficulty getting his ulcerative colitis under control.  He has been on weekly Humira for about 6 to 7 weeks.  During his visit at the end of September, it was suggested that he complete a stool sample to rule out an infection.  He was found to have C. difficile.  Oral vancomycin 125 mg 4 times daily was sent to his pharmacy.  He reports taking it for 5 days and then stopping the medication because of abdominal pain.  The patient believes that the vancomycin was contributing to his abdominal pain.  He was able to stay hydrated but had little appetite.  He was passing frequent bowel movements 3-6 times per day.  More recently over the past 3 to 4 days he has been seeing red blood mixed with his stool.    In the emergency room a CT scan of the abdomen and pelvis does show colitis with sparing of the cecum.  He did have an elevated white count and no fever.  He was given a dose of IV steroids and a dose of IV metronidazole.    Patient reports significant improvement in his abdominal pain overnight.  He believes that the pain medication has been very helpful for him.  His hemoglobin is  stable at 12.4.  His white count on admission was 20, with elevated absolute neutrophils.       PAST MEDICAL HISTORY:  Patient Active Problem List    Diagnosis Date Noted    Nausea 10/08/2023     Priority: Medium    Weakness 10/08/2023     Priority: Medium    C. difficile colitis 10/08/2023     Priority: Medium    Other fatigue 10/08/2023     Priority: Medium    Ulcerative colitis with rectal bleeding, unspecified location (H) 10/08/2023     Priority: Medium    Rectal bleeding 06/13/2022     Priority: Medium          ROS: A comprehensive ten point review of systems was negative aside from those in mentioned in the HPI.       MEDICATIONS:   Prior to Admission medications    Medication Sig Start Date End Date Taking? Authorizing Provider   balsalazide (COLAZAL) 750 MG capsule Take 4 capsules by mouth daily   Yes Unknown, Entered By History   balsalazide (COLAZAL) 750 MG capsule Take 5 capsules by mouth every evening   Yes Unknown, Entered By History   vancomycin (VANCOCIN) 125 MG capsule Take 125 mg by mouth 4 times daily 10/2/23  Yes Unknown, Entered By History   vitamin D2 (ERGOCALCIFEROL) 91425 units (1250 mcg) capsule Take 50,000 Units by mouth once a week 1/24/23  Yes Unknown, Entered By History        ALLERGIES:   Allergies   Allergen Reactions    Blood-Group Specific Substance      Weak warm autoantibody identified. Expect possible delay in blood for transfusion. Draw at least 2 large lavender tops for type and screen requests.         SOCIAL HISTORY: Patient does not use NSAIDs and does not smoke.        FAMILY HISTORY: Noncontributory.       PHYSICAL EXAM:     BP 99/64 (BP Location: Right arm, Patient Position: Supine)   Pulse 72   Temp 97.9  F (36.6  C) (Oral)   Resp 16   Wt 70.8 kg (156 lb)   SpO2 97%   BMI 22.38 kg/m       PHYSICAL EXAM:  GENERAL: No distress, resting comfortably  SKIN: no suspicious lesions, rashes, jaundice  HEAD: Normocephalic. Atraumatic.  NECK: Neck supple. No adenopathy.    EYES: No scleral icterus  GASTROINTESTINAL: soft, non tender, non distended, no guarding/rebound  JOINT/EXTREMITIES:  no gross deformities noted, normal muscle tone  NEURO: CN 2-12 grossly intact, no focal deficits  PSYCH: Normal affect       ADDITIONAL COMMENTS:   I reviewed the patient's new clinical lab test results.     Recent Labs   Lab 10/08/23  0615 10/08/23  0244   WBC 19.0* 20.0*   RBC 4.79 5.37   HGB 12.4* 14.0   HCT 38.2* 42.4   MCV 80 79   MCH 25.9* 26.1*   MCHC 32.5 33.0   RDW 14.3 14.4    443     Recent Labs   Lab Test 10/08/23  0615 10/08/23  0244 07/08/23  0737   POTASSIUM 4.0 3.5 4.1   CHLORIDE 103 101 105   CO2 26 24 27   BUN 6.3 7.4 15   ANIONGAP 9 14 8     Recent Labs   Lab Test 10/08/23  0615 10/08/23  0534 10/08/23  0244 07/08/23  0737 06/14/22  2238 05/27/21  2243   ALBUMIN 3.0*  --  3.4* 3.8   < > 3.4*   BILITOTAL 0.3  --  0.4 0.5   < > 0.6   ALT 5  --  6 12   < > 105*   AST 18  --  22 17   < > 60*   PROTEIN  --  10*  --   --   --   --    LIPASE  --   --  23 70*  --  102*    < > = values in this interval not displayed.         Recent Labs   Lab 10/08/23  0244   LIPASE 23       Recent Results (from the past 24 hour(s))   CT Abdomen Pelvis w Contrast    Narrative    EXAM: CT ABDOMEN PELVIS W CONTRAST  LOCATION: [facility]  DATE: 10/8/2023    INDICATION: n d.  New diagnosis of C. difficile.  Worsening bloody diarrhea.  History of UC.  COMPARISON: 11/08/2023  TECHNIQUE: CT scan of the abdomen and pelvis was performed following injection of IV contrast. Multiplanar reformats were obtained. Dose reduction techniques were used.  CONTRAST: 100ml Isovue 370    FINDINGS:   LOWER CHEST: Normal.    HEPATOBILIARY: No significant mass or bile duct dilatation. No calcified gallstones.     PANCREAS: No significant mass, duct dilatation, or inflammatory change.    SPLEEN: Normal size.    ADRENAL GLANDS: No significant nodules.    KIDNEYS/BLADDER: No significant mass, stone,  or hydronephrosis.    BOWEL: Mural thickening is seen throughout the ascending transverse descending and sigmoid colon to the level of the rectum with relative sparing of the cecum and terminal ileum. A pericolonic stranding is noted most pronounced in the rectum and sigmoid   colon. No organized pericolonic fluid collections are identified. There are a few prominent fluid-filled loops of proximal jejunum in the left upper quadrant of the abdomen, without small bowel obstruction.    LYMPH NODES: Increased number of subcentimeter mesenteric lymph nodes are noted.    VASCULATURE: No abdominal aortic aneurysm.    PELVIC ORGANS: No pelvic masses.    MUSCULOSKELETAL: Unremarkable.      Impression    IMPRESSION:   1.  Mural thickening seen throughout the colon with relative sparing of the cecum, with pericolonic stranding noted, which may reflect inflammatory and/or infectious colitis given patient's history. There is no evidence of colonic perforation, or   pericolonic abscess at this time.  2.  A few prominent loops of proximal jejunum with decompression gradually, favored reflect focal ileus and/or enteritis.  3.  Subcentimeter mesenteric lymphadenopathy, favored to be reactive in nature         CONSULTATION ASSESSMENT AND PLAN:    Michael Mo is a 37 year old is a 37 year old male with medical history of chronic pan ulcerative colitis (diagnosed in July 2021) and history of C. difficile in April 2023, who presents to the emergency room with worsening abdominal pain and diarrhea despite 5 days of treatment with oral vancomycin for C. Difficile found to have colitis on CT scan.    #1: Ulcerative pancolitis and C. difficile diarrhea: Patient has had difficulty getting his ulcerative colitis under control.  About 6 weeks ago he was transition to weekly Humira and in his opinion without significant improvement.  However he was found to be C. difficile positive at the end of September.  Patient's abdominal pain has  improved with pain medicine and IV steroid.  He is willing to restart oral vancomycin after extensive discussion.  He did receive 1 dose of IV metronidazole in the ER as well.    -- We will continue with IV steroids Q8 throughout today  -- No further IV metronidazole at this time  -- Restart oral vancomycin  -- If patient fails to improve with medical management, will need flexible sigmoidoscopy.  -- Outpatient plans are in place for checking Humira drug level and antibodies, then potentially switching to Stelara pending results.      I discussed the patient plan with Dr. Dillon, GI staff physician. Thank you for asking us to participate in the care of this patient.    70 min of total time was spent providing patient care, including patient evaluation, reviewing documentation/ test results, and .     Heather Garcia PA-C  [facility] ( [facility])       Addendum:    I have met and examined the patient and agree with history and findings as per Heather Garcia, DESHAWN above.  This is a 37-year-old male with ulcerative pancolitis, on weekly Humira, here with flare of his colitis associated with C. difficile infection.  He is feeling better today compared to yesterday.  Is currently on steroids and vancomycin.  He had received IV Flagyl until he was able to take oral and the Flagyl was discontinued today.  He does continue to have loose stools with blood and similar frequency but generally is feeling better overall.  He is interested in trying a diet.  Examination: Abdomen soft and nontender currently    Impression:  Ulcerative pancolitis with flare in the setting of C. difficile infection    Recommendations:  1.  Continue Solu-Medrol 20 mg every 8 hours for today.  If he is feeling well tomorrow we will transition to oral with taper as follows: 40 mg of prednisone by mouth daily for 7 days, then 30 mg daily for 7 days, then 20 mg daily for 7 days, then taper by 5 mg each week until off  2.  Continue  vancomycin 125 mg by mouth 4 times daily for total of 2 weeks.  He may benefit from a taper over the course of 6 weeks in light of this being a recurrent infection for him.  3.  We will begin diet trial as tolerated  4.  GI following.  Please call with questions or concerns.    A total of 20 minutes was spent reviewing the record, interacting and evaluating patient, and generating note.    Anibal Dillon MD  [facility]

## 2023-10-08 NOTE — ED TRIAGE NOTES
Pt reports tonight he has been having diarrhea, abdominal pain, and fatigue/weakness. Pt reports that his stool is yellow and having blood in stool. Last Saturday provider diagnosed him with CDIFF. Pt reports he has been on antibiotics since this past Monday. HX of Ulcerative colitis.      Triage Assessment       Row Name 10/08/23 0217       Triage Assessment (Adult)    Airway WDL WDL       Respiratory WDL    Respiratory WDL WDL       Skin Circulation/Temperature WDL    Skin Circulation/Temperature WDL WDL       Cardiac WDL    Cardiac WDL WDL       Peripheral/Neurovascular WDL    Peripheral Neurovascular WDL WDL       Cognitive/Neuro/Behavioral WDL    Cognitive/Neuro/Behavioral WDL WDL

## 2023-10-08 NOTE — PLAN OF CARE
Goal Outcome Evaluation:  Problem: Plan of Care - These are the overarching goals to be used throughout the patient stay.    Goal: Plan of Care Review  Description: The Plan of Care Review/Shift note should be completed every shift.  The Outcome Evaluation is a brief statement about your assessment that the patient is improving, declining, or no change.  This information will be displayed automatically on your shift note.  10/8/2023 0610 by Andi Still RN  Outcome: Progressing  Pt is alert and oriented x4. Pt's vital signs are stable, see flowsheet for details. Pt is voiding without difficulty and adequately. Pt denies flank and bladder pain. Pt's pain is controlled. Pt reports minimal pain. CMS intact. Up indp.Pt is tolerating diet. Calls appropriately and can make needs known. Pt is on enteric precautions for rule out C-diff. Stool Specimen collection unable to obtain due to patient not having a BM during shift. Nurse will continue to monitor.

## 2023-10-08 NOTE — PLAN OF CARE
H&P completed on 10/8, this is a brief summary, please refer to H&P for further details.    Mr. Mo is a 37 years old man with past medical history significant for ulcerative colitis, recently diagnosed C. difficile who presented to the hospital with abdominal discomfort and diarrhea.      C. difficile colitis  Possible exacerbation of ulcerative colitis  -Abdominal pain and diarrhea for the last week.  -At home on Humira.  -Diagnosed with C. difficile infection the end of September and was initiated on oral vancomycin.  -Stopped oral vancomycin for the last few days due to his diarrhea and abdominal discomfort.      Plan  -Appreciate GI recommendation, restarting oral vancomycin, continuing IV steroid for possible ulcerative colitis flare.  -If the patient's symptoms did not improve, might need flexible sigmoidoscopy.  -Pain management with Tylenol, Norco and Dilaudid  -Continue normal saline  -Diet management as per GI.

## 2023-10-09 LAB
BASO+EOS+MONOS # BLD AUTO: ABNORMAL 10*3/UL
BASO+EOS+MONOS NFR BLD AUTO: ABNORMAL %
BASOPHILS # BLD AUTO: 0 10E3/UL (ref 0–0.2)
BASOPHILS NFR BLD AUTO: 0 %
EOSINOPHIL # BLD AUTO: 0 10E3/UL (ref 0–0.7)
EOSINOPHIL NFR BLD AUTO: 0 %
ERYTHROCYTE [DISTWIDTH] IN BLOOD BY AUTOMATED COUNT: 14.4 % (ref 10–15)
HCT VFR BLD AUTO: 37.2 % (ref 40–53)
HGB BLD-MCNC: 12 G/DL (ref 13.3–17.7)
IMM GRANULOCYTES # BLD: 0.1 10E3/UL
IMM GRANULOCYTES NFR BLD: 1 %
LYMPHOCYTES # BLD AUTO: 1.8 10E3/UL (ref 0.8–5.3)
LYMPHOCYTES NFR BLD AUTO: 12 %
MAGNESIUM SERPL-MCNC: 2.3 MG/DL (ref 1.7–2.3)
MCH RBC QN AUTO: 25.7 PG (ref 26.5–33)
MCHC RBC AUTO-ENTMCNC: 32.3 G/DL (ref 31.5–36.5)
MCV RBC AUTO: 80 FL (ref 78–100)
MONOCYTES # BLD AUTO: 1.3 10E3/UL (ref 0–1.3)
MONOCYTES NFR BLD AUTO: 9 %
NEUTROPHILS # BLD AUTO: 11.9 10E3/UL (ref 1.6–8.3)
NEUTROPHILS NFR BLD AUTO: 78 %
NRBC # BLD AUTO: 0 10E3/UL
NRBC BLD AUTO-RTO: 0 /100
PLATELET # BLD AUTO: 413 10E3/UL (ref 150–450)
POTASSIUM SERPL-SCNC: 4.2 MMOL/L (ref 3.4–5.3)
RBC # BLD AUTO: 4.67 10E6/UL (ref 4.4–5.9)
WBC # BLD AUTO: 15.1 10E3/UL (ref 4–11)

## 2023-10-09 PROCEDURE — 84132 ASSAY OF SERUM POTASSIUM: CPT | Performed by: INTERNAL MEDICINE

## 2023-10-09 PROCEDURE — 120N000001 HC R&B MED SURG/OB

## 2023-10-09 PROCEDURE — 83735 ASSAY OF MAGNESIUM: CPT | Performed by: STUDENT IN AN ORGANIZED HEALTH CARE EDUCATION/TRAINING PROGRAM

## 2023-10-09 PROCEDURE — 250N000011 HC RX IP 250 OP 636: Performed by: PHYSICIAN ASSISTANT

## 2023-10-09 PROCEDURE — 36415 COLL VENOUS BLD VENIPUNCTURE: CPT | Performed by: STUDENT IN AN ORGANIZED HEALTH CARE EDUCATION/TRAINING PROGRAM

## 2023-10-09 PROCEDURE — 85025 COMPLETE CBC W/AUTO DIFF WBC: CPT | Performed by: STUDENT IN AN ORGANIZED HEALTH CARE EDUCATION/TRAINING PROGRAM

## 2023-10-09 PROCEDURE — 250N000013 HC RX MED GY IP 250 OP 250 PS 637: Performed by: PHYSICIAN ASSISTANT

## 2023-10-09 PROCEDURE — 258N000003 HC RX IP 258 OP 636: Performed by: INTERNAL MEDICINE

## 2023-10-09 PROCEDURE — 250N000013 HC RX MED GY IP 250 OP 250 PS 637: Performed by: INTERNAL MEDICINE

## 2023-10-09 PROCEDURE — 99232 SBSQ HOSP IP/OBS MODERATE 35: CPT | Performed by: STUDENT IN AN ORGANIZED HEALTH CARE EDUCATION/TRAINING PROGRAM

## 2023-10-09 RX ORDER — SIMETHICONE 80 MG
80 TABLET,CHEWABLE ORAL EVERY 6 HOURS PRN
Status: DISCONTINUED | OUTPATIENT
Start: 2023-10-09 | End: 2023-10-16 | Stop reason: HOSPADM

## 2023-10-09 RX ORDER — PREDNISONE 20 MG/1
40 TABLET ORAL DAILY
Status: DISCONTINUED | OUTPATIENT
Start: 2023-10-10 | End: 2023-10-10

## 2023-10-09 RX ADMIN — SODIUM CHLORIDE: 9 INJECTION, SOLUTION INTRAVENOUS at 15:03

## 2023-10-09 RX ADMIN — VANCOMYCIN HYDROCHLORIDE 125 MG: 125 CAPSULE ORAL at 13:52

## 2023-10-09 RX ADMIN — SIMETHICONE 80 MG: 80 TABLET, CHEWABLE ORAL at 22:28

## 2023-10-09 RX ADMIN — METHYLPREDNISOLONE SODIUM SUCCINATE 20 MG: 40 INJECTION, POWDER, FOR SOLUTION INTRAMUSCULAR; INTRAVENOUS at 06:04

## 2023-10-09 RX ADMIN — VANCOMYCIN HYDROCHLORIDE 125 MG: 125 CAPSULE ORAL at 09:47

## 2023-10-09 RX ADMIN — VANCOMYCIN HYDROCHLORIDE 125 MG: 125 CAPSULE ORAL at 17:13

## 2023-10-09 RX ADMIN — SODIUM CHLORIDE: 9 INJECTION, SOLUTION INTRAVENOUS at 06:08

## 2023-10-09 RX ADMIN — VANCOMYCIN HYDROCHLORIDE 125 MG: 125 CAPSULE ORAL at 21:29

## 2023-10-09 RX ADMIN — METHYLPREDNISOLONE SODIUM SUCCINATE 20 MG: 40 INJECTION, POWDER, FOR SOLUTION INTRAMUSCULAR; INTRAVENOUS at 13:52

## 2023-10-09 ASSESSMENT — ACTIVITIES OF DAILY LIVING (ADL)
ADLS_ACUITY_SCORE: 18

## 2023-10-09 NOTE — PROGRESS NOTES
"CLINICAL NUTRITION SERVICES - ASSESSMENT NOTE     Nutrition Prescription    RECOMMENDATIONS FOR MDs/PROVIDERS TO ORDER:  May benefit from a Vit D supplement     Malnutrition Status:    % Weight Loss:  > 7.5% in 3 months (severe malnutrition)  % Intake:  </= 75% for >/= 1 month (severe malnutrition)  Subcutaneous Fat Loss:  None observed  Muscle Loss:  None observed  Fluid Retention:  None noted    Malnutrition Diagnosis: Severe malnutrition  In Context of:  Chronic illness or disease    Recommendations already ordered by Registered Dietitian (RD):  Ensure Enlive tid     Future/Additional Recommendations:  Will monitor progress towards goals     REASON FOR ASSESSMENT  Michael Mo is a/an 37 year old male assessed by the dietitian for Admission Nutrition Risk Screen for positive for wt loss, reduced po     Pertinent Medical Admission/History: cdiff colitis with exacerbation of ulcerative colitis, on humira at home     NUTRITION HISTORY  Allergies: NKFA  Pt started Humira approx 6 weeks ago and he feels it has had no impact. He limits high fiber and fatty foods as they are harder to digest. He has been drinking Ensure or Boost 3 x daily at home. He feels his appetite has been down as well as not absorbing foods due to diarrhea     CURRENT NUTRITION ORDERS  Diet: Low Fiber  Intake/Tolerance: Intake not yet documented    PHYSICAL FINDINGS  See malnutrition section below.  Per flowsheet:  Edema- not noted   GI- diarrhea   Skin- no skin breakdown noted   Pain- denies pain  Oral- no oral concerns noted     LABS  Labs reviewed, Ca-8.0    MEDICATIONS  Medications reviewed, Solumedrol, NaCL at 125ml/hr    ANTHROPOMETRICS  Height: 177.8cm, 5'10\"  Most Recent Weight: 70.2 kg (154 lb 12.8 oz)    IBW: 75 kg  BMI: Normal BMI  Weight History:   Wt Readings from Last 10 Encounters:   10/09/23 70.2 kg (154 lb 12.8 oz)   07/08/23 79.4 kg (175 lb)   06/14/22 81.6 kg (180 lb)   06/13/22 81.6 kg (180 lb)     Pt down 21# in 3 months " (12%)     ASSESSED NUTRITION NEEDS  Dosing Weight: 70 kg  Estimated Energy Needs: 7561-7636 kcals/day (25 - 30 kcals/kg)  Justification: Maintenance and Repletion  Estimated Protein Needs: 70-84 grams protein/day (1 - 1.2 grams of pro/kg)  Justification: Maintenance and Repletion  Estimated Fluid Needs: 1215-2995 mL/day (25 - 30 mL/kg)   Justification: Maintenance        MALNUTRITION:  % Weight Loss:  > 7.5% in 3 months (severe malnutrition)  % Intake:  </= 75% for >/= 1 month (severe malnutrition)  Subcutaneous Fat Loss:  None observed  Muscle Loss:  None observed  Fluid Retention:  None noted    Malnutrition Diagnosis: Severe malnutrition  In Context of:  Chronic illness or disease    NUTRITION DIAGNOSIS  Malnutrition related to ulcerative colitis with suspected malabsorption and reduced po as evidenced by wt loss, reduced po     INTERVENTIONS  Implementation  Ensure tid  May benefit from a Vit D supplement as well   Education Needs- none at this time     Goals  Patient to consume % of nutritionally adequate meals three times per day, or the equivalent with supplements/snacks.  Reduce diarrhea  Maintain wt at 70kg      Monitoring/Evaluation  Will monitor po, supplement tolerance, and GI fx

## 2023-10-09 NOTE — PROGRESS NOTES
GASTROENTEROLOGY PROGRESS NOTE     SUBJECTIVE: tolerating some food now. Bleeding has resolved. Still with abdominal pain but tolerable. Loose stools every 2 to 3 hours. On humira 40 mg weekly and balsalazide 9 tablets daily. Followed by Dr. Quiles for crohns management. Currently on IV steroids, recently discontinued oral steroids.    Vancomycin for C diff started one week ago, stopped on Saturday due to worsening pain now back on therapy. He feels bloated. C diff testing here negative.     CT showing mural thickening throughout the colon sparing the cecum.     OBJECTIVE:   /80 (BP Location: Right arm)   Pulse 87   Temp 97.6  F (36.4  C) (Oral)   Resp 18   Wt 70.2 kg (154 lb 12.8 oz)   SpO2 99%   BMI 22.21 kg/m     Temp (24hrs), Av.9  F (36.6  C), Min:97.6  F (36.4  C), Max:98.1  F (36.7  C)     Patient Vitals for the past 72 hrs:   Weight   10/09/23 0453 70.2 kg (154 lb 12.8 oz)   10/08/23 0606 70.8 kg (156 lb)        Intake/Output Summary (Last 24 hours) at 10/9/2023 1033  Last data filed at 10/9/2023 0500  Gross per 24 hour   Intake 1104.58 ml   Output --   Net 1104.58 ml      PHYSICAL EXAM   Constitutional: Healthy, in bed, no acute distress  Respiratory: respirations non labored.   Abdomen: Soft, tenderness,     I have reviewed the patient's new clinical lab results:   Recent Labs   Lab Test 10/09/23  0521 10/08/23  0615 10/08/23  0244 06/14/22  2238 22  1341   WBC 15.1* 19.0* 20.0*   < > 17.5*   HGB 12.0* 12.4* 14.0   < > 15.5   MCV 80 80 79   < > 80    393 443   < > 367   INR  --   --   --   --  1.12    < > = values in this interval not displayed.      Recent Labs   Lab Test 10/09/23  0521 10/08/23  0615 10/08/23  0244 23  0737   NA  --  138 139 140   POTASSIUM 4.2 4.0 3.5 4.1   CHLORIDE  --  103 101 105   CO2  --  26 24 27   BUN  --  6.3 7.4 15   CR  --  1.18* 1.25* 1.19   ANIONGAP  --  9 14 8   INNA  --  8.0* 9.0 9.3      Recent Labs   Lab Test 10/08/23  0615  10/08/23  0534 10/08/23  0244 07/08/23  0737 06/14/22  2238 05/27/21  2243   ALBUMIN 3.0*  --  3.4* 3.8   < > 3.4*   BILITOTAL 0.3  --  0.4 0.5   < > 0.6   ALT 5  --  6 12   < > 105*   AST 18  --  22 17   < > 60*   ALKPHOS 71  --  82 76   < > 154*   PROTEIN  --  10*  --   --   --   --    LIPASE  --   --  23 70*  --  102*    < > = values in this interval not displayed.      Assessment:  this is a 37 year old male with chronic panulcerative colitis ( diagnosed in 2021) and history of c diff in April 2023 who presented to ER for symptoms of worsening abdominal pain and diarrhea- c diff treatment x 5 days then pain worsened prompting ER evaluation. CT shows pan colitis sparing the cecum now responding to steroids, vancomycin has been restarted to complete therapy and recent c diff testing has been negative. He is slowly improving. If he does not continue to improve then flex sig for evaluation.     Avelar colitis likely due to ulcerative colitis flare now responding to steroids. Continue to monitor symptoms. Currently ulcerative colitis therapy of humira and balsalazide.   2. C diff infection - continue vancomycin.   Plan:    Continue vancomycin  Continue IV steroids.   If he does not continue to improve then flex sig    Will switch to oral prednisone    Approximately 20 minutes of total time was spent providing patient care, including patient evaluation, reviewing documentation/test result, and .  Shabana Miller [facility]   Office

## 2023-10-09 NOTE — PLAN OF CARE
Goal Outcome Evaluation:  Pt is A&Ox4. VSS. Pt reports mild pain, no meds needed. Pt does have mild discomfort after eating. Independent in room. Discharge plan for 10/9/23 to home pending clinical improvement as determined by provider.

## 2023-10-09 NOTE — UTILIZATION REVIEW
Admission Status; Secondary Review Determination       Under the authority of the Utilization Management Committee, the utilization review process indicated a secondary review on the above patient. The review outcome is based on review of the medical records, discussions with staff, and applying clinical experience noted on the date of the review.     (x) Inpatient Status Appropriate - This patient's medical care is consistent with medical management for inpatient care and reasonable inpatient medical practice.     RATIONALE FOR DETERMINATION     Mr. Mo is a 38 yo male with a PMH of ulcerative colitis (on chronic immunosuppressive therapy) who presents to the ED with abd discomfort and diarrhea.  Was recently diagnosed with C diff colitis but presented to the ED as unable to tolerate po vanco d/t the above symptoms.  CT scan with pancoliits and clinical s/s concerning for early sepsis (leukocytosis, tachycardia and acute renal failure).  GI consulted and following. Remains on po vanco, IVF and IV steroids q8h.  Slowly tolerating some po intake today.  He is requiring ongoing inpatient medical treatment and monitoring today.       At the time of admission with the information available to the attending physician more than 2 nights Hospital complex care was anticipated, based on patient risk of adverse outcome if treated as outpatient and complex care required. Inpatient admission is appropriate based on the Medicare guidelines.         The information on this document is developed by the utilization review team in order for the business office to ensure compliance. This only denotes the appropriateness of proper admission status and does not reflect the quality of care rendered.   The definitions of Inpatient Status and Observation Status used in making the determination above are those provided in the CMS Coverage Manual, Chapter 1 and Chapter 6, section 70.4.         Sincerely,     Nena Aponte,  DO  Utilization Review  Physician Advisor  [facility].

## 2023-10-09 NOTE — PLAN OF CARE
Goal Outcome Evaluation:    Patient is alert and oriented x4. Vital signs are stable. Independent in room. Denies pain. NS infusing at 125 ml/hr. Denies passing stools on this shift. Mag and K protocols for lab redraws tomorrow morning. Tolerating low fiber diet.

## 2023-10-09 NOTE — PROGRESS NOTES
[facility]    Medicine Progress Note - Hospitalist Service    Date of Admission:  10/8/2023    Assessment & Plan   Mr. Mo is a 37 years old man with past medical history significant for ulcerative colitis, recently diagnosed C. difficile who presented to the hospital with abdominal discomfort and diarrhea.  Currently being managed for pancolitis due to ulcerative colitis flare and C. difficile colitis.  Symptoms slowly improving since admission. Continues to have diarrhea.  Gastroenterology following.  Possible discharge in 1 to 2 days if diarrhea improves and cleared to discharge from gastroenterology standpoint.     Pancolitis  C. difficile colitis  Ulcerative colitis flare  -At home on Humira.  -Abdominal pain and diarrhea for the last week.  -Diagnosed with C. difficile infection the end of September and was initiated on oral vancomycin. Stopped oral vancomycin few days prior to this hospitalization due to abdominal discomfort.  -CT scan, showed pancolitis.  -Initiated on IV steroid on presentation, abdominal pain improving, continues to have nonbloody diarrhea.  -C. difficile negative.     Plan  -Appreciate GI recommendation.  -Continue oral vancomycin  -Continue methylprednisolone 20 mg every 8 hours.  -Continue low fiber diet.  -If the patient's symptoms did not improve, might need flexible sigmoidoscopy.  -Pain management with Tylenol, Norco and Dilaudid  -Continue normal saline         Diet: Low Fiber Diet    DVT Prophylaxis: Low Risk/Ambulatory with no VTE prophylaxis indicated  Meredith Catheter: Not present  Lines: None     Cardiac Monitoring: None  Code Status: Full Code      Clinically Significant Risk Factors              # Hypoalbuminemia: Lowest albumin = 3 g/dL at 10/8/2023  6:15 AM, will monitor as appropriate                       Disposition Plan      Expected Discharge Date: 10/09/2023                  CECIL HO MD  Hospitalist Service  [facility]  Hospital  Securely message with [employer] (more info)  Text page via [facility] Paging/Directory   ______________________________________________________________________    Interval History   No significant events.  Abdominal pain slowly improving. Continues to have multiple episodes of watery diarrhea.  Denies any fever or chills.     Physical Exam   Vital Signs: Temp: 97.6  F (36.4  C) Temp src: Oral BP: 117/80 Pulse: 87   Resp: 18 SpO2: 99 % O2 Device: None (Room air)    Weight: 154 lbs 12.8 oz    Physical Exam  Constitutional:       General: He is not in acute distress.     Appearance: He is not ill-appearing or toxic-appearing.   Cardiovascular:      Rate and Rhythm: Normal rate.      Heart sounds: No murmur heard.  Pulmonary:      Effort: Pulmonary effort is normal. No respiratory distress.   Abdominal:      General: Abdomen is flat. There is no distension.      Palpations: Abdomen is soft.      Tenderness: There is no guarding.   Skin:     General: Skin is warm and dry.   Neurological:      Mental Status: He is alert.          Medical Decision Making       40 MINUTES SPENT BY ME on the date of service doing chart review, history, exam, documentation & further activities per the note.      Data     I have personally reviewed the following data over the past 24 hrs:    15.1 (H)  \   12.0 (L)   / 413     N/A N/A N/A /  N/A   4.2 N/A N/A \       Imaging results reviewed over the past 24 hrs:   No results found for this or any previous visit (from the past 24 hour(s)).

## 2023-10-10 LAB
ANION GAP SERPL CALCULATED.3IONS-SCNC: 8 MMOL/L (ref 7–15)
BASO+EOS+MONOS # BLD AUTO: ABNORMAL 10*3/UL
BASO+EOS+MONOS NFR BLD AUTO: ABNORMAL %
BASOPHILS # BLD AUTO: 0 10E3/UL (ref 0–0.2)
BASOPHILS NFR BLD AUTO: 0 %
BUN SERPL-MCNC: 13.6 MG/DL (ref 6–20)
CALCIUM SERPL-MCNC: 8.3 MG/DL (ref 8.6–10)
CHLORIDE SERPL-SCNC: 108 MMOL/L (ref 98–107)
CREAT SERPL-MCNC: 1.02 MG/DL (ref 0.67–1.17)
DEPRECATED HCO3 PLAS-SCNC: 26 MMOL/L (ref 22–29)
EGFRCR SERPLBLD CKD-EPI 2021: >90 ML/MIN/1.73M2
EOSINOPHIL # BLD AUTO: 0 10E3/UL (ref 0–0.7)
EOSINOPHIL NFR BLD AUTO: 0 %
ERYTHROCYTE [DISTWIDTH] IN BLOOD BY AUTOMATED COUNT: 14.7 % (ref 10–15)
GLUCOSE SERPL-MCNC: 92 MG/DL (ref 70–99)
HCT VFR BLD AUTO: 36.7 % (ref 40–53)
HGB BLD-MCNC: 11.7 G/DL (ref 13.3–17.7)
IMM GRANULOCYTES # BLD: 0.1 10E3/UL
IMM GRANULOCYTES NFR BLD: 1 %
LYMPHOCYTES # BLD AUTO: 4.4 10E3/UL (ref 0.8–5.3)
LYMPHOCYTES NFR BLD AUTO: 26 %
MAGNESIUM SERPL-MCNC: 2.2 MG/DL (ref 1.7–2.3)
MCH RBC QN AUTO: 25.9 PG (ref 26.5–33)
MCHC RBC AUTO-ENTMCNC: 31.9 G/DL (ref 31.5–36.5)
MCV RBC AUTO: 81 FL (ref 78–100)
MONOCYTES # BLD AUTO: 1.8 10E3/UL (ref 0–1.3)
MONOCYTES NFR BLD AUTO: 10 %
NEUTROPHILS # BLD AUTO: 10.7 10E3/UL (ref 1.6–8.3)
NEUTROPHILS NFR BLD AUTO: 63 %
NRBC # BLD AUTO: 0 10E3/UL
NRBC BLD AUTO-RTO: 0 /100
PLATELET # BLD AUTO: 421 10E3/UL (ref 150–450)
POTASSIUM SERPL-SCNC: 3.9 MMOL/L (ref 3.4–5.3)
RBC # BLD AUTO: 4.52 10E6/UL (ref 4.4–5.9)
SODIUM SERPL-SCNC: 142 MMOL/L (ref 135–145)
WBC # BLD AUTO: 17 10E3/UL (ref 4–11)

## 2023-10-10 PROCEDURE — 250N000013 HC RX MED GY IP 250 OP 250 PS 637: Performed by: INTERNAL MEDICINE

## 2023-10-10 PROCEDURE — 258N000003 HC RX IP 258 OP 636: Performed by: INTERNAL MEDICINE

## 2023-10-10 PROCEDURE — 83735 ASSAY OF MAGNESIUM: CPT | Performed by: STUDENT IN AN ORGANIZED HEALTH CARE EDUCATION/TRAINING PROGRAM

## 2023-10-10 PROCEDURE — 250N000013 HC RX MED GY IP 250 OP 250 PS 637: Performed by: PHYSICIAN ASSISTANT

## 2023-10-10 PROCEDURE — 85025 COMPLETE CBC W/AUTO DIFF WBC: CPT | Performed by: STUDENT IN AN ORGANIZED HEALTH CARE EDUCATION/TRAINING PROGRAM

## 2023-10-10 PROCEDURE — 250N000013 HC RX MED GY IP 250 OP 250 PS 637: Performed by: HOSPITALIST

## 2023-10-10 PROCEDURE — 36415 COLL VENOUS BLD VENIPUNCTURE: CPT | Performed by: STUDENT IN AN ORGANIZED HEALTH CARE EDUCATION/TRAINING PROGRAM

## 2023-10-10 PROCEDURE — 250N000011 HC RX IP 250 OP 636: Performed by: HOSPITALIST

## 2023-10-10 PROCEDURE — 99232 SBSQ HOSP IP/OBS MODERATE 35: CPT | Performed by: HOSPITALIST

## 2023-10-10 PROCEDURE — 250N000011 HC RX IP 250 OP 636: Performed by: NURSE PRACTITIONER

## 2023-10-10 PROCEDURE — 120N000001 HC R&B MED SURG/OB

## 2023-10-10 PROCEDURE — 80048 BASIC METABOLIC PNL TOTAL CA: CPT | Performed by: STUDENT IN AN ORGANIZED HEALTH CARE EDUCATION/TRAINING PROGRAM

## 2023-10-10 PROCEDURE — 99222 1ST HOSP IP/OBS MODERATE 55: CPT | Mod: GC | Performed by: STUDENT IN AN ORGANIZED HEALTH CARE EDUCATION/TRAINING PROGRAM

## 2023-10-10 PROCEDURE — 250N000012 HC RX MED GY IP 250 OP 636 PS 637: Performed by: NURSE PRACTITIONER

## 2023-10-10 RX ORDER — BENZOCAINE/MENTHOL 6 MG-10 MG
LOZENGE MUCOUS MEMBRANE 3 TIMES DAILY PRN
Status: DISCONTINUED | OUTPATIENT
Start: 2023-10-10 | End: 2023-10-16 | Stop reason: HOSPADM

## 2023-10-10 RX ORDER — HEPARIN SODIUM 5000 [USP'U]/.5ML
5000 INJECTION, SOLUTION INTRAVENOUS; SUBCUTANEOUS 2 TIMES DAILY
Status: DISCONTINUED | OUTPATIENT
Start: 2023-10-10 | End: 2023-10-12

## 2023-10-10 RX ORDER — METHYLPREDNISOLONE SODIUM SUCCINATE 40 MG/ML
20 INJECTION, POWDER, LYOPHILIZED, FOR SOLUTION INTRAMUSCULAR; INTRAVENOUS EVERY 8 HOURS
Status: DISCONTINUED | OUTPATIENT
Start: 2023-10-10 | End: 2023-10-15

## 2023-10-10 RX ORDER — METRONIDAZOLE 500 MG/100ML
500 INJECTION, SOLUTION INTRAVENOUS EVERY 12 HOURS
Status: DISCONTINUED | OUTPATIENT
Start: 2023-10-10 | End: 2023-10-13

## 2023-10-10 RX ORDER — METHYLPREDNISOLONE SODIUM SUCCINATE 40 MG/ML
20 INJECTION, POWDER, LYOPHILIZED, FOR SOLUTION INTRAMUSCULAR; INTRAVENOUS EVERY 8 HOURS
Status: DISCONTINUED | OUTPATIENT
Start: 2023-10-10 | End: 2023-10-10

## 2023-10-10 RX ORDER — BALSALAZIDE DISODIUM 750 MG/1
3750 CAPSULE ORAL EVERY EVENING
Status: DISCONTINUED | OUTPATIENT
Start: 2023-10-11 | End: 2023-10-11

## 2023-10-10 RX ORDER — LOPERAMIDE HCL 2 MG
2 CAPSULE ORAL 2 TIMES DAILY
Status: DISCONTINUED | OUTPATIENT
Start: 2023-10-10 | End: 2023-10-10

## 2023-10-10 RX ORDER — LOPERAMIDE HCL 2 MG
2 CAPSULE ORAL 2 TIMES DAILY
Status: DISCONTINUED | OUTPATIENT
Start: 2023-10-10 | End: 2023-10-11

## 2023-10-10 RX ORDER — BALSALAZIDE DISODIUM 750 MG/1
3000 CAPSULE ORAL DAILY
Status: DISCONTINUED | OUTPATIENT
Start: 2023-10-10 | End: 2023-10-11

## 2023-10-10 RX ADMIN — VANCOMYCIN HYDROCHLORIDE 125 MG: 125 CAPSULE ORAL at 09:26

## 2023-10-10 RX ADMIN — HEPARIN SODIUM 5000 UNITS: 5000 INJECTION, SOLUTION INTRAVENOUS; SUBCUTANEOUS at 21:29

## 2023-10-10 RX ADMIN — METHYLPREDNISOLONE SODIUM SUCCINATE 20 MG: 40 INJECTION, POWDER, FOR SOLUTION INTRAMUSCULAR; INTRAVENOUS at 17:13

## 2023-10-10 RX ADMIN — LOPERAMIDE HYDROCHLORIDE 2 MG: 2 CAPSULE ORAL at 14:58

## 2023-10-10 RX ADMIN — PREDNISONE 40 MG: 20 TABLET ORAL at 09:26

## 2023-10-10 RX ADMIN — BALSALAZIDE DISODIUM 3000 MG: 750 CAPSULE ORAL at 13:46

## 2023-10-10 RX ADMIN — SIMETHICONE 80 MG: 80 TABLET, CHEWABLE ORAL at 11:26

## 2023-10-10 RX ADMIN — SODIUM CHLORIDE: 9 INJECTION, SOLUTION INTRAVENOUS at 06:26

## 2023-10-10 RX ADMIN — VANCOMYCIN HYDROCHLORIDE 125 MG: 125 CAPSULE ORAL at 21:30

## 2023-10-10 RX ADMIN — LOPERAMIDE HYDROCHLORIDE 2 MG: 2 CAPSULE ORAL at 21:30

## 2023-10-10 RX ADMIN — SODIUM CHLORIDE: 9 INJECTION, SOLUTION INTRAVENOUS at 17:28

## 2023-10-10 RX ADMIN — METRONIDAZOLE 500 MG: 500 INJECTION, SOLUTION INTRAVENOUS at 22:17

## 2023-10-10 RX ADMIN — METRONIDAZOLE 500 MG: 500 INJECTION, SOLUTION INTRAVENOUS at 11:26

## 2023-10-10 RX ADMIN — VANCOMYCIN HYDROCHLORIDE 125 MG: 125 CAPSULE ORAL at 17:12

## 2023-10-10 RX ADMIN — HYDROCORTISONE: 1 CREAM TOPICAL at 01:07

## 2023-10-10 RX ADMIN — VANCOMYCIN HYDROCHLORIDE 125 MG: 125 CAPSULE ORAL at 13:46

## 2023-10-10 ASSESSMENT — ACTIVITIES OF DAILY LIVING (ADL)
ADLS_ACUITY_SCORE: 18

## 2023-10-10 NOTE — PLAN OF CARE
Problem: Bowel Disease, Inflammatory (Ulcerative Colitis or Crohn's Disease)  Goal: Diarrhea Symptom Relief  Intervention: Manage Diarrhea  Recent Flowsheet Documentation  Taken 10/10/2023 0900 by Sera Natarajan RN  Perineal Care: protective cream/ointment applied     Problem: Malnutrition  Goal: Improved Nutritional Intake  Outcome: Progressing   Pt continues to have loose watery brown stools almost every hour since this am.  No bloody or black stools.  Rectum is raw, cream applied.  Pt has poor appetite, trying to give his bowels a rest.  No breakfast  and  only pudding, strawberry shortcake and tea for lunch. VSS. Pt given simethicone for sharp gas pains.  Plan is for flex sig tomorrow. Pt agrees with this plan.

## 2023-10-10 NOTE — CONSULTS
Attending attestation:  The patient was seen and discussed with Dr. Johnson.  Briefly, patient with ulcerative colitis.  Possible C. difficile in the spring 2023 treated with p.o. vancomycin for 14 days.  Recurrent explosive diarrhea with blood about 10 days ago.  Had C. difficile testing done at [facility].  Test more consistent with colonization than active disease.  5 days of p.o. vancomycin prior to admission did not make any difference.  Resolution of the bloody aspect of the diarrhea once initiated on steroid.  C. difficile negative.  I am suspecting this is more colonization than C. difficile infection.  We can complete a 10-day course including the 5 days prior to admission and stop.  Going for flex sig tomorrow.  I concur.  Recent enteric bacteria and virus negative.    Discussed with the patient, nursing staff, Pharm.D.    ID will follow.    Lamont Faulkner MD      Consultation - Infectious Disease  Michael Mo,  1985, MRN [id number]    Admitting Dx: Nausea [R11.0]  Weakness [R53.1]  C. difficile colitis [A04.72]  Other fatigue [R53.83]  Ulcerative colitis with rectal bleeding, unspecified location (H) [K51.911]    PCP: Palomo Dillard, [phone]   Code status:  Full Code       Extended Emergency Contact Information  Primary Emergency Contact: Fran Mo   [place]  Mobile Phone: [phone]  Relation: Relative  Secondary Emergency Contact: Ara Mo  Mobile Phone: [phone]  Relation: Mother       ASSESSMENT   C. Diff colitis  Ulcerative colitis  Patient reports being diagnosed with C. difficile at the end of September and started on oral Vancomycin.  Having 6+ stools per day plus fevers.  No recent antibiotic exposure aside from this current dose of vancomycin.  He completed 5 days oral vanc then developed severe abdominal pain and was unable to complete further doses then presented to the emergency department.  Oral vanc was reinitiated on admission so he only missed  1 day of treatment. Prior C. Diff infection in March 2023 treated with 2 weeks of oral vancomycin.  Negative C. difficile toxin on labs during this admission. Will need to reach out to [facility] to confirm what testing was done at their clinic if this was PCR and toxin positive then likely active infection vs PCR positive only indicating colonization.          PLAN   - Continue Vancomycin 125 mg 4 times daily for total of 10 day course   -Okay to continue Flagyl given the active UC flare  - Go over [facility] results for C. Diff diagnosis     Wander Johnson MD - [facility] Resident     Attending Dr. Lucie MD  [facility]  ______________________________________________________________________        Reason For Consult: Other fatigue       HPI    We have been requested by Dr. Smith to evaluate Michael Mo who is a 37 year old year old male for the above.     The patient reports that he was diagnosed with ulcerative colitis 3 years ago.  He started having worsening flares starting in September 2022.  Diagnosed with C. difficile in March 2023 and completed a 2-week course of oral vancomycin.  Has had several flares since then that have shown negative C. difficile infection.  He follows at [facility] for the ulcerative colitis.  Reports that he was diagnosed with C. difficile again at the end of September and started on oral vancomycin.  He was able to complete 5 doses of the oral vancomycin before having unbearable stomach pain forcing him to stop taking the medication and present to the emergency department.  Also having hematochezia on presentation.  Since he has been here he has been reinitiated on the oral vancomycin and has continued to have hourly bouts of diarrhea though the fevers and hematochezia have resolved.     Medical History  No past medical history on file. Surgical History  He  has no past surgical history on file.   Social History  Reviewed, and he  reports that he has  never smoked. He has never used smokeless tobacco.   Allergies  Allergies   Allergen Reactions    Blood-Group Specific Substance      Weak warm autoantibody identified. Expect possible delay in blood for transfusion. Draw at least 2 large lavender tops for type and screen requests.     Family History  family history is not on file.  family history not pertinent to presenting problem.    Psychosocial Needs  Social History     Social History Narrative    Not on file     Additional psychosocial needs reviewed per nursing assessment.       Past infections: C. difficile infection in March 2023 treated with oral vancomycin    Prior to Admission Medications   Medications Prior to Admission   Medication Sig Dispense Refill Last Dose    adalimumab (HUMIRA *CF*) 40 MG/0.4ML pen kit Inject 40 mg Subcutaneous every 7 days   Past Week    balsalazide (COLAZAL) 750 MG capsule Take 4 capsules by mouth daily   10/7/2023 at am    balsalazide (COLAZAL) 750 MG capsule Take 5 capsules by mouth every evening   10/7/2023 at pm    vancomycin (VANCOCIN) 125 MG capsule Take 125 mg by mouth 4 times daily   10/7/2023 at am - started 10/2    vitamin D2 (ERGOCALCIFEROL) 00589 units (1250 mcg) capsule Take 50,000 Units by mouth once a week   Unknown at am          Anti-infectives: Oral vancomycin and Flagyl  Cultures: Blood cultures NGTD.  Stool panel negative.  C. difficile toxin/PCR negative.  Imaging: reviewed images          Review of Systems:  All other systems negative in detail except what is noted above. Physical Exam:  Temp:  [97.8  F (36.6  C)-98.4  F (36.9  C)] 97.8  F (36.6  C)  Pulse:  [67-83] 83  Resp:  [16] 16  BP: (106-119)/(64-69) 112/68  SpO2:  [96 %-97 %] 97 %    GENERAL:  In no acute distress, is alert and oriented to person, place, time.  EYES: No conjunctival injection, extra-ocular movement intact  RESPIRATORY: No increased work of breathing  ABDOMEN: Soft, nontender, no masses, no organomegaly.    SKIN/HAIR/NAILS: No  rashes, no signs of peripheral emboli.  NEUROLOGIC: Grossly intact.       Pertinent Labs         Recent Labs   Lab 10/10/23  0530 10/08/23  0615 10/08/23  0244    138 139   CO2 26 26 24   BUN 13.6 6.3 7.4       Lab Results   Component Value Date    ALT 5 10/08/2023    AST 18 10/08/2023    ALKPHOS 71 10/08/2023          CRP Inflammation   Date Value Ref Range Status   10/08/2023 57.10 (H) <5.00 mg/L Final        Pertinent Radiology  Radiology Results: Reviewed  CT Abdomen Pelvis w Contrast    Result Date: 10/8/2023  EXAM: CT ABDOMEN PELVIS W CONTRAST LOCATION: [facility] DATE: 10/8/2023 INDICATION: n d.  New diagnosis of C. difficile.  Worsening bloody diarrhea.  History of UC. COMPARISON: 11/08/2023 TECHNIQUE: CT scan of the abdomen and pelvis was performed following injection of IV contrast. Multiplanar reformats were obtained. Dose reduction techniques were used. CONTRAST: 100ml Isovue 370 FINDINGS: LOWER CHEST: Normal. HEPATOBILIARY: No significant mass or bile duct dilatation. No calcified gallstones. PANCREAS: No significant mass, duct dilatation, or inflammatory change. SPLEEN: Normal size. ADRENAL GLANDS: No significant nodules. KIDNEYS/BLADDER: No significant mass, stone, or hydronephrosis. BOWEL: Mural thickening is seen throughout the ascending transverse descending and sigmoid colon to the level of the rectum with relative sparing of the cecum and terminal ileum. A pericolonic stranding is noted most pronounced in the rectum and sigmoid colon. No organized pericolonic fluid collections are identified. There are a few prominent fluid-filled loops of proximal jejunum in the left upper quadrant of the abdomen, without small bowel obstruction. LYMPH NODES: Increased number of subcentimeter mesenteric lymph nodes are noted. VASCULATURE: No abdominal aortic aneurysm. PELVIC ORGANS: No pelvic masses. MUSCULOSKELETAL: Unremarkable.     IMPRESSION: 1.  Mural thickening seen  throughout the colon with relative sparing of the cecum, with pericolonic stranding noted, which may reflect inflammatory and/or infectious colitis given patient's history. There is no evidence of colonic perforation, or pericolonic abscess at this time. 2.  A few prominent loops of proximal jejunum with decompression gradually, favored reflect focal ileus and/or enteritis. 3.  Subcentimeter mesenteric lymphadenopathy, favored to be reactive in nature

## 2023-10-10 NOTE — PROGRESS NOTES
GASTROENTEROLOGY PROGRESS NOTE     SUBJECTIVE: increased bowel frequency from yesterday. WBC up but on steroids. Resolution of bloody diarrhea. Requesting therapy to slow the stools down given the frequency of every hour. No significant abdominal pain.      OBJECTIVE:   /68 (BP Location: Right arm)   Pulse 83   Temp 97.8  F (36.6  C) (Oral)   Resp 16   Wt 70.9 kg (156 lb 6.4 oz)   SpO2 97%   BMI 22.44 kg/m     Temp (24hrs), Av.1  F (36.7  C), Min:97.8  F (36.6  C), Max:98.4  F (36.9  C)     Patient Vitals for the past 72 hrs:   Weight   10/10/23 0524 70.9 kg (156 lb 6.4 oz)   10/09/23 0453 70.2 kg (154 lb 12.8 oz)   10/08/23 0606 70.8 kg (156 lb)        Intake/Output Summary (Last 24 hours) at 10/10/2023 1044  Last data filed at 10/9/2023 1503  Gross per 24 hour   Intake 1100 ml   Output --   Net 1100 ml      PHYSICAL EXAM   Constitutional: Healthy, in bed, no acute distress  Cardiovascular: Regular rate and rhythm.   Respiratory: Clear to auscultation bilaterally, respirations non labored.   Abdomen: Soft, non-tender, non-distended, normally active bowel sounds.     I have reviewed the patient's new clinical lab results:   Recent Labs   Lab Test 10/10/23  0530 10/09/23  0521 10/08/23  0615 06/14/22  2238 22  1341   WBC 17.0* 15.1* 19.0*   < > 17.5*   HGB 11.7* 12.0* 12.4*   < > 15.5   MCV 81 80 80   < > 80    413 393   < > 367   INR  --   --   --   --  1.12    < > = values in this interval not displayed.      Recent Labs   Lab Test 10/10/23  0530 10/09/23  0521 10/08/23  0615 10/08/23  0244     --  138 139   POTASSIUM 3.9 4.2 4.0 3.5   CHLORIDE 108*  --  103 101   CO2 26  --  26 24   BUN 13.6  --  6.3 7.4   CR 1.02  --  1.18* 1.25*   ANIONGAP 8  --  9 14   INNA 8.3*  --  8.0* 9.0      Recent Labs   Lab Test 10/08/23  0615 10/08/23  0534 10/08/23  0244 23  0737 22  2238 21  2243   ALBUMIN 3.0*  --  3.4* 3.8   < > 3.4*   BILITOTAL 0.3  --  0.4 0.5   < > 0.6   ALT 5   --  6 12   < > 105*   AST 18  --  22 17   < > 60*   ALKPHOS 71  --  82 76   < > 154*   PROTEIN  --  10*  --   --   --   --    LIPASE  --   --  23 70*  --  102*    < > = values in this interval not displayed.      Assessment:   is a 37 year old male with chronic panulcerative colitis ( diagnosed in 2021) and history of c diff in April 2023 who presented to ER for symptoms of worsening abdominal pain and diarrhea- c diff treatment x 5 days then pain worsened prompting ER evaluation. CT shows pan colitis sparing the cecum now responding to steroids, vancomycin has been restarted to complete therapy and recent c diff testing has been negative. He was slowly improving but now with increased bowel frequency today. No significant abdominal pain but passing liquid every hour. He wonders about therapies to slow the stool down.      Pan colitis likely due to ulcerative colitis flare was responding to steroids. He was switched to oral yesterday, I will resume IV steroids today. Continue to monitor symptoms. Currently ulcerative colitis therapy of humira and balsalazide.   2. C diff infection - continue vancomycin. will add IV flagyl.       Plan:    IV steroids  Metronidazole  Continue vancomycin  I will discuss additional therapies for the diarrhea with Dr Dillon.   Approximately 20 minutes of total time was spent providing patient care, including patient evaluation, reviewing documentation/test result, and . Discussed case with Dr. Dillon.   Shabana Mliler [facility]   Office      Addendum   Discussed with Dr. Dillon   Imodium 1 tablet two times daily - hold for formed stools  Flex sig tomorrow  1 tap water enema prior to the procedure.

## 2023-10-10 NOTE — PROGRESS NOTES
[facility]    Medicine Progress Note - Hospitalist Service    Date of Admission:  10/8/2023    Assessment & Plan   Michael Mo is a 37 year old male admitted with a UC flare in the setting of cdiff colitis.  He is clinically stable.  Restarted home balsalazide.  Steroids per GI.  Patient reports he is on humira weekly, with a dose due today 10/10/23.  Consulting ID for medical management of cdiff.    # Cdiff colitis  # UC flare  - PO vancomycin  - ID consulted to clarify appropriate regimen and duration  - steroids per GI       Diet: Low Fiber Diet  Snacks/Supplements Adult: Ensure Enlive; With Meals      Meredith Catheter: Not present  Lines: None     Cardiac Monitoring: None  Code Status: Full Code      Clinically Significant Risk Factors              # Hypoalbuminemia: Lowest albumin = 3 g/dL at 10/8/2023  6:15 AM, will monitor as appropriate             # Severe Malnutrition: based on nutrition assessment, PRESENT ON ADMISSION          Disposition Plan      Expected Discharge Date: 10/11/2023        Discharge Comments: IV steroids; pending sx; GI          Surinder Smith MD  Hospitalist Service  [facility]  Securely message with [employer] (more info)  Text page via [employer] Paging/Directory   ______________________________________________________________________    Interval History   Reports stool frequency is every hour.  Denies blood in stool.  Denies vomiting.  Tolerated a diet yesterday.  Reports right sided abdominal pain.    Physical Exam   Vital Signs: Temp: 97.8  F (36.6  C) Temp src: Oral BP: 112/68 Pulse: 83   Resp: 16 SpO2: 97 % O2 Device: None (Room air)    Weight: 156 lbs 6.4 oz    Gen: lying in bed in no extremis  Neuro: alert, conversant  CV:  nl rate, regular rhythm to palpation'  Pulm: no acute resp distress  GI:  abdomen soft, non distended, mild right sided TTP    Medical Decision Making             Data   Reviewed:  Na 142  K 3.9  BUN 14  Cr  1    WBC 17  Hgb 12  Plts 421

## 2023-10-10 NOTE — PLAN OF CARE
Goal Outcome Evaluation:      Problem: Bowel Disease, Inflammatory (Ulcerative Colitis or Crohn's Disease)  Goal: Optimal Adaptation to Chronic Illness  Outcome: Progressing  Goal: Diarrhea Symptom Relief  Outcome: Progressing  Goal: Absence of Infection Signs and Symptoms  Outcome: Progressing  Goal: Optimal Nutrition Delivery  Outcome: Progressing  Goal: Optimal Pain Control and Function  Outcome: Progressing   Patient is alert and oriented. Vital signs are stable. Patient denies pain. NS infusing at 125 ml/hr in PIV. Patient reports having multiple loose stool and rectal discomfort from wiping. New orders for CORTAID cream for bottom. Low fiber diet, denies nausea. Independent in the room. Calls appropriately.

## 2023-10-11 LAB
MAGNESIUM SERPL-MCNC: 3.1 MG/DL (ref 1.7–2.3)
POTASSIUM SERPL-SCNC: 4.1 MMOL/L (ref 3.4–5.3)

## 2023-10-11 PROCEDURE — 250N000011 HC RX IP 250 OP 636: Mod: JZ | Performed by: NURSE PRACTITIONER

## 2023-10-11 PROCEDURE — 88305 TISSUE EXAM BY PATHOLOGIST: CPT | Mod: 26 | Performed by: PATHOLOGY

## 2023-10-11 PROCEDURE — 45330 DIAGNOSTIC SIGMOIDOSCOPY: CPT | Performed by: INTERNAL MEDICINE

## 2023-10-11 PROCEDURE — 88305 TISSUE EXAM BY PATHOLOGIST: CPT | Mod: TC | Performed by: INTERNAL MEDICINE

## 2023-10-11 PROCEDURE — 84132 ASSAY OF SERUM POTASSIUM: CPT | Performed by: HOSPITALIST

## 2023-10-11 PROCEDURE — 120N000001 HC R&B MED SURG/OB

## 2023-10-11 PROCEDURE — 0DBN8ZX EXCISION OF SIGMOID COLON, VIA NATURAL OR ARTIFICIAL OPENING ENDOSCOPIC, DIAGNOSTIC: ICD-10-PCS | Performed by: INTERNAL MEDICINE

## 2023-10-11 PROCEDURE — 999N000099 HC STATISTIC MODERATE SEDATION < 10 MIN: Performed by: INTERNAL MEDICINE

## 2023-10-11 PROCEDURE — 250N000013 HC RX MED GY IP 250 OP 250 PS 637: Performed by: HOSPITALIST

## 2023-10-11 PROCEDURE — 250N000011 HC RX IP 250 OP 636: Performed by: HOSPITALIST

## 2023-10-11 PROCEDURE — 88342 IMHCHEM/IMCYTCHM 1ST ANTB: CPT | Mod: 26 | Performed by: PATHOLOGY

## 2023-10-11 PROCEDURE — 83735 ASSAY OF MAGNESIUM: CPT | Performed by: HOSPITALIST

## 2023-10-11 PROCEDURE — 36415 COLL VENOUS BLD VENIPUNCTURE: CPT | Performed by: HOSPITALIST

## 2023-10-11 PROCEDURE — 99232 SBSQ HOSP IP/OBS MODERATE 35: CPT | Performed by: HOSPITALIST

## 2023-10-11 PROCEDURE — 250N000013 HC RX MED GY IP 250 OP 250 PS 637: Performed by: PHYSICIAN ASSISTANT

## 2023-10-11 PROCEDURE — 258N000003 HC RX IP 258 OP 636: Performed by: INTERNAL MEDICINE

## 2023-10-11 PROCEDURE — 258N000003 HC RX IP 258 OP 636: Performed by: NURSE PRACTITIONER

## 2023-10-11 PROCEDURE — 250N000011 HC RX IP 250 OP 636: Performed by: INTERNAL MEDICINE

## 2023-10-11 PROCEDURE — 99231 SBSQ HOSP IP/OBS SF/LOW 25: CPT | Mod: GC | Performed by: STUDENT IN AN ORGANIZED HEALTH CARE EDUCATION/TRAINING PROGRAM

## 2023-10-11 RX ORDER — FENTANYL CITRATE 50 UG/ML
INJECTION, SOLUTION INTRAMUSCULAR; INTRAVENOUS PRN
Status: DISCONTINUED | OUTPATIENT
Start: 2023-10-11 | End: 2023-10-11 | Stop reason: HOSPADM

## 2023-10-11 RX ORDER — NALOXONE HYDROCHLORIDE 0.4 MG/ML
0.2 INJECTION, SOLUTION INTRAMUSCULAR; INTRAVENOUS; SUBCUTANEOUS
Status: DISCONTINUED | OUTPATIENT
Start: 2023-10-11 | End: 2023-10-11 | Stop reason: HOSPADM

## 2023-10-11 RX ORDER — FLUMAZENIL 0.1 MG/ML
0.2 INJECTION, SOLUTION INTRAVENOUS
Status: DISCONTINUED | OUTPATIENT
Start: 2023-10-11 | End: 2023-10-11 | Stop reason: HOSPADM

## 2023-10-11 RX ORDER — FENTANYL CITRATE 50 UG/ML
50-100 INJECTION, SOLUTION INTRAMUSCULAR; INTRAVENOUS EVERY 5 MIN PRN
Status: DISCONTINUED | OUTPATIENT
Start: 2023-10-11 | End: 2023-10-11 | Stop reason: HOSPADM

## 2023-10-11 RX ORDER — LIDOCAINE 40 MG/G
CREAM TOPICAL
Status: DISCONTINUED | OUTPATIENT
Start: 2023-10-11 | End: 2023-10-11 | Stop reason: HOSPADM

## 2023-10-11 RX ORDER — DIPHENHYDRAMINE HYDROCHLORIDE 50 MG/ML
25-50 INJECTION INTRAMUSCULAR; INTRAVENOUS
Status: DISCONTINUED | OUTPATIENT
Start: 2023-10-11 | End: 2023-10-11 | Stop reason: HOSPADM

## 2023-10-11 RX ORDER — EPINEPHRINE 1 MG/ML
0.1 INJECTION, SOLUTION INTRAMUSCULAR; SUBCUTANEOUS
Status: DISCONTINUED | OUTPATIENT
Start: 2023-10-11 | End: 2023-10-11 | Stop reason: HOSPADM

## 2023-10-11 RX ORDER — NALOXONE HYDROCHLORIDE 0.4 MG/ML
0.4 INJECTION, SOLUTION INTRAMUSCULAR; INTRAVENOUS; SUBCUTANEOUS
Status: DISCONTINUED | OUTPATIENT
Start: 2023-10-11 | End: 2023-10-11 | Stop reason: HOSPADM

## 2023-10-11 RX ORDER — ATROPINE SULFATE 0.1 MG/ML
1 INJECTION INTRAVENOUS
Status: DISCONTINUED | OUTPATIENT
Start: 2023-10-11 | End: 2023-10-11 | Stop reason: HOSPADM

## 2023-10-11 RX ORDER — SIMETHICONE 40MG/0.6ML
133 SUSPENSION, DROPS(FINAL DOSAGE FORM)(ML) ORAL
Status: DISCONTINUED | OUTPATIENT
Start: 2023-10-11 | End: 2023-10-11 | Stop reason: HOSPADM

## 2023-10-11 RX ADMIN — BALSALAZIDE DISODIUM 3000 MG: 750 CAPSULE ORAL at 09:45

## 2023-10-11 RX ADMIN — METRONIDAZOLE 500 MG: 500 INJECTION, SOLUTION INTRAVENOUS at 22:06

## 2023-10-11 RX ADMIN — METHYLPREDNISOLONE SODIUM SUCCINATE 20 MG: 40 INJECTION, POWDER, FOR SOLUTION INTRAMUSCULAR; INTRAVENOUS at 01:03

## 2023-10-11 RX ADMIN — SODIUM CHLORIDE: 9 INJECTION, SOLUTION INTRAVENOUS at 09:45

## 2023-10-11 RX ADMIN — SODIUM CHLORIDE: 9 INJECTION, SOLUTION INTRAVENOUS at 01:11

## 2023-10-11 RX ADMIN — METHYLPREDNISOLONE SODIUM SUCCINATE 20 MG: 40 INJECTION, POWDER, FOR SOLUTION INTRAMUSCULAR; INTRAVENOUS at 16:27

## 2023-10-11 RX ADMIN — VANCOMYCIN HYDROCHLORIDE 125 MG: 125 CAPSULE ORAL at 16:27

## 2023-10-11 RX ADMIN — VANCOMYCIN HYDROCHLORIDE 125 MG: 125 CAPSULE ORAL at 09:45

## 2023-10-11 RX ADMIN — INFLIXIMAB 700 MG: 100 INJECTION, POWDER, LYOPHILIZED, FOR SOLUTION INTRAVENOUS at 17:48

## 2023-10-11 RX ADMIN — VANCOMYCIN HYDROCHLORIDE 125 MG: 125 CAPSULE ORAL at 13:36

## 2023-10-11 RX ADMIN — METHYLPREDNISOLONE SODIUM SUCCINATE 20 MG: 40 INJECTION, POWDER, FOR SOLUTION INTRAMUSCULAR; INTRAVENOUS at 09:45

## 2023-10-11 RX ADMIN — METRONIDAZOLE 500 MG: 500 INJECTION, SOLUTION INTRAVENOUS at 11:45

## 2023-10-11 RX ADMIN — VANCOMYCIN HYDROCHLORIDE 125 MG: 125 CAPSULE ORAL at 20:13

## 2023-10-11 ASSESSMENT — ACTIVITIES OF DAILY LIVING (ADL)
ADLS_ACUITY_SCORE: 18

## 2023-10-11 NOTE — PLAN OF CARE
Problem: Malnutrition  Goal: Improved Nutritional Intake  Outcome: Progressing     Problem: Bowel Disease, Inflammatory (Ulcerative Colitis or Crohn's Disease)  Goal: Diarrhea Symptom Relief  Intervention: Manage Diarrhea  Recent Flowsheet Documentation  Taken 10/11/2023 0930 by Sera Natarajan RN  Perineal Care: protective cream/ointment applied   Pt NPO waiting for flexible sigmoidoscopy.  Minimal stool today b/o NPO status and ate minimal yesterday.  Did note a small amount of blood in stool this am.  Minimal abdominal pain.  VSS.  Pt is up independently and ambulates in the room and in the halls.

## 2023-10-11 NOTE — PLAN OF CARE
Goal Outcome Evaluation:    Problem: Bowel Disease, Inflammatory (Ulcerative Colitis or Crohn's Disease)  Goal: Diarrhea Symptom Relief  Outcome: Progressing     Problem: Bowel Disease, Inflammatory (Ulcerative Colitis or Crohn's Disease)  Goal: Absence of Infection Signs and Symptoms  Outcome: Progressing  Intervention: Prevent or Manage Infection  Recent Flowsheet Documentation  Taken 10/11/2023 0100 by Alessandra Lang RN  Isolation Precautions: enteric precautions maintained     Patient is alert and oriented. Vital signs are stable. Denies pain. IV steroid given per order. NS infusing at 125 ml/hr. Bowel sounds are hyperactive. Patient reports having loose stools this shift. He describes them as being mostly clear. Patient is NPO for Flex Sig 10/11. Independent in room. Calls appropriately. Patient remains on enteric precautions.

## 2023-10-11 NOTE — PROGRESS NOTES
Pt requesting refill on Levothyroxine, protocol passed, refill approved    LOV 11/13/17     LF 10/20/17 #90    No future appointments. [facility]    Medicine Progress Note - Hospitalist Service    Date of Admission:  10/8/2023    Assessment & Plan   Michael Mo is a 37 year old male admitted with a UC flare in the setting of cdiff colitis.  He is clinically stable.  GI with plans for endoscopic evaluation today.  Discontinue loperamide.  This medication is contraindicated in the setting of cdiff colitis.  This medication can be used after completing vancomycin course.    # Cdiff colitis  # UC flare  - PO vancomycin until 10/13 at noon  - metronidazole  - ID following  - steroids per GI    Addendum:  Discussed GI, their plan is to change therapy for UC and advised OK to stop basalazide.        Diet: Snacks/Supplements Adult: Ensure Enlive; With Meals  NPO per Anesthesia Guidelines for Procedure/Surgery Except for: Meds    Meredith Catheter: Not present  Lines: None     Cardiac Monitoring: None  Code Status: Full Code      Clinically Significant Risk Factors              # Hypoalbuminemia: Lowest albumin = 3 g/dL at 10/8/2023  6:15 AM, will monitor as appropriate             # Severe Malnutrition: based on nutrition assessment, PRESENT ON ADMISSION          Disposition Plan      Expected Discharge Date: 10/12/2023        Discharge Comments: IV steroids; pending sx; GI            Surinder Smith MD  Hospitalist Service  [facility]  Securely message with [employer] (more info)  Text page via [employer] Paging/Directory   ______________________________________________________________________    Interval History   Reports improvement in stool frequency.  Had blood in one bowel movement.  Denies abdominal pain or vomiting.    Physical Exam   Vital Signs: Temp: 98.8  F (37.1  C) Temp src: Oral BP: 118/65 Pulse: 68   Resp: 16 SpO2: 99 % O2 Device: None (Room air)    Weight: 152 lbs 9.6 oz    Gen: sitting in bed in no acute distress  Neuro:  alert, conversant  CV:  nl rate, regular rhythm to palpation  Pulm: no  acute resp distress  GI:  abdomen soft, non distended, NTTP      Medical Decision Making             Data   Reviewed:    K 4.1  Mg 3.1

## 2023-10-11 NOTE — PROGRESS NOTES
Attending attestation:  The patient was seen and discussed with Dr. Johnson.  Briefly, patient with ulcerative colitis.  Possible C. difficile in the spring 2023 treated with p.o. vancomycin for 14 days.  Recurrent explosive diarrhea with blood about 10 days ago.  Had C. difficile testing done at [facility].  Test more consistent with colonization than active disease.  5 days of p.o. vancomycin prior to admission did not make any difference.  Resolution of the bloody aspect of the diarrhea once initiated on steroid.  C. difficile negative.  I am suspecting this is more colonization than C. difficile infection.  We can complete a 10-day course including the 5 days prior to admission and stop.  Going for flex sig today.  I concur.  Recent enteric bacteria and virus negative.  Continue Flagyl for now    Discussed with the patient, nursing staff.    ID will follow.    INFECTIOUS DISEASE FOLLOW UP NOTE      ASSESSMENT:  C. Diff colonization  UC flare  Plan for flex sig today. One episode of BRBPR since yesterday; small amount. Reviewed C. Difficile testing with [facility] who reported C diff GENE ANDERS was positive on 9/29. The c diff toxin A&B negative.  Similar positive results 4/21 and negative results 6/29 and 8/23. These results represent colonization vs active infection. [facility] also plans on fecal transplant in the future.     PLAN:  - Continue Vancomycin 125 mg 4 times daily for total of 10 day course (including the days prior to admission)  - Continue Flagyl given the active UC flare     Wander Johnson MD - [facility] Medicine Resident      Attending Dr. Lucie MD  [facility]    ______________________________________________________________________    SUBJECTIVE / INTERVAL HISTORY:  No fevers. One episode of BRBPR since yesterday; small amount. Unclear if ongoing diarrhea given the NPO status and enema for flex sig today.     ROS: All other systems negative except as listed  "above.        OBJECTIVE:  /65 (BP Location: Right arm)   Pulse 68   Temp 98.8  F (37.1  C) (Oral)   Resp 16   Wt 69.2 kg (152 lb 9.6 oz)   SpO2 99%   BMI 21.90 kg/m         Vital Signs  Temp: 98.8  F (37.1  C)  Temp src: Oral  Resp: 16  Pulse: 68  Pulse Rate Source: Monitor  BP: 118/65  BP Location: Right arm    Temp (24hrs), Av.2  F (36.8  C), Min:97.6  F (36.4  C), Max:98.8  F (37.1  C)      GEN: No acute distress.    RESPIRATORY:  Normal breathing pattern.   ABDOMEN: Mild abdominal pain in the RLQ on palpation. Soft, normal bowel sounds, no masses, no organomegaly.  SKIN/HAIR/NAILS:  No rashes  IV: peripheral        Antibiotics:  Oral vancomycin. IV Flagly.    Pertinent labs:    Recent Labs   Lab 10/10/23  0530 10/09/23  0521 10/08/23  0615   WBC 17.0* 15.1* 19.0*   HGB 11.7* 12.0* 12.4*   HCT 36.7* 37.2* 38.2*    413 393        Recent Labs   Lab 10/10/23  0530 10/08/23  0615 10/08/23  0244    138 139   CO2 26 26 24   BUN 13.6 6.3 7.4      No results found for: \"CRP\"    Lab Results   Component Value Date    ALT 5 10/08/2023    AST 18 10/08/2023    ALKPHOS 71 10/08/2023         MICROBIOLOGY DATA:  No growth on blood cultures after 2 days    RADIOLOGY:  CT Abdomen Pelvis w Contrast    Result Date: 10/8/2023  EXAM: CT ABDOMEN PELVIS W CONTRAST LOCATION: [facility] DATE: 10/8/2023 INDICATION: n d.  New diagnosis of C. difficile.  Worsening bloody diarrhea.  History of UC. COMPARISON: 2023 TECHNIQUE: CT scan of the abdomen and pelvis was performed following injection of IV contrast. Multiplanar reformats were obtained. Dose reduction techniques were used. CONTRAST: 100ml Isovue 370 FINDINGS: LOWER CHEST: Normal. HEPATOBILIARY: No significant mass or bile duct dilatation. No calcified gallstones. PANCREAS: No significant mass, duct dilatation, or inflammatory change. SPLEEN: Normal size. ADRENAL GLANDS: No significant nodules. KIDNEYS/BLADDER: No significant " mass, stone, or hydronephrosis. BOWEL: Mural thickening is seen throughout the ascending transverse descending and sigmoid colon to the level of the rectum with relative sparing of the cecum and terminal ileum. A pericolonic stranding is noted most pronounced in the rectum and sigmoid colon. No organized pericolonic fluid collections are identified. There are a few prominent fluid-filled loops of proximal jejunum in the left upper quadrant of the abdomen, without small bowel obstruction. LYMPH NODES: Increased number of subcentimeter mesenteric lymph nodes are noted. VASCULATURE: No abdominal aortic aneurysm. PELVIC ORGANS: No pelvic masses. MUSCULOSKELETAL: Unremarkable.     IMPRESSION: 1.  Mural thickening seen throughout the colon with relative sparing of the cecum, with pericolonic stranding noted, which may reflect inflammatory and/or infectious colitis given patient's history. There is no evidence of colonic perforation, or pericolonic abscess at this time. 2.  A few prominent loops of proximal jejunum with decompression gradually, favored reflect focal ileus and/or enteritis. 3.  Subcentimeter mesenteric lymphadenopathy, favored to be reactive in nature

## 2023-10-11 NOTE — PLAN OF CARE
Goal Outcome Evaluation:  Pt is A&Ox4. VSS. Mild pain in abdomen. Pt was able to tolerate more of a full liquid diet. Tap water enema administered in anticipation of flex sigmoid 10/11/23. No time schedule at this time.  Pt is independent in room. Discharge TBD pending clinical improvement as determined by provider.

## 2023-10-11 NOTE — PROGRESS NOTES
Flex sig with ongoing ulcerative colitis flare, failed humira weekly will start remicade. Quantiferon 6/2022.     Shabana Miller CNP  [phone]

## 2023-10-11 NOTE — PROGRESS NOTES
Care Management Follow Up    Length of Stay (days): 3    Expected Discharge Date: 10/12/2023     Concerns to be Addressed:       Patient plan of care discussed at interdisciplinary rounds: Yes    Anticipated Discharge Disposition: Home     Anticipated Discharge Services:    Anticipated Discharge DME:      Patient/family educated on Medicare website which has current facility and service quality ratings:    Education Provided on the Discharge Plan:    Patient/Family in Agreement with the Plan:      Referrals Placed by CM/SW:    Private pay costs discussed: Not applicable    Additional Information:  Chart reviewed, pt independent at baseline.  Anticipate home at discharge.  Care Management available as needed for any discharge needs.    DEANDRE Mustafa

## 2023-10-11 NOTE — PROGRESS NOTES
PRE-PROCEDURE NOTE      REASON FOR PROCEDURE: Ulcerative colitis    History and Physical: Reviewed, no changes    Pre-sedation Assessment:     VS: AVSS  General: Alert, NAD  Airway: normal  Heart: RRR  Lungs: CTA    Previous Reaction to Sedation: None    Sedation Plan Based on Assessment: Moderate    Mallampati: II    ASA Classification: 2      Impression: Patient deemed adequate candidate for moderate sedation    Plan: flexible sigmoidoscopy              Anibal Dillon MD  Thank you for the opportunity to participate in the care of this patient.   Please feel free to call me with any questions or concerns.  Phone number [phone].            10/11/2023 2:45 PM

## 2023-10-12 LAB
FLEXIBLE SIGMOIDOSCOPY: NORMAL
MAGNESIUM SERPL-MCNC: 2.5 MG/DL (ref 1.7–2.3)
POTASSIUM SERPL-SCNC: 4.4 MMOL/L (ref 3.4–5.3)

## 2023-10-12 PROCEDURE — 84132 ASSAY OF SERUM POTASSIUM: CPT | Performed by: HOSPITALIST

## 2023-10-12 PROCEDURE — 250N000011 HC RX IP 250 OP 636: Performed by: HOSPITALIST

## 2023-10-12 PROCEDURE — 36415 COLL VENOUS BLD VENIPUNCTURE: CPT | Performed by: HOSPITALIST

## 2023-10-12 PROCEDURE — 120N000001 HC R&B MED SURG/OB

## 2023-10-12 PROCEDURE — 250N000013 HC RX MED GY IP 250 OP 250 PS 637: Performed by: PHYSICIAN ASSISTANT

## 2023-10-12 PROCEDURE — 99232 SBSQ HOSP IP/OBS MODERATE 35: CPT | Performed by: HOSPITALIST

## 2023-10-12 PROCEDURE — 83735 ASSAY OF MAGNESIUM: CPT | Performed by: HOSPITALIST

## 2023-10-12 PROCEDURE — 250N000011 HC RX IP 250 OP 636: Performed by: NURSE PRACTITIONER

## 2023-10-12 PROCEDURE — 258N000003 HC RX IP 258 OP 636: Performed by: INTERNAL MEDICINE

## 2023-10-12 RX ORDER — ENOXAPARIN SODIUM 100 MG/ML
40 INJECTION SUBCUTANEOUS DAILY
Status: DISCONTINUED | OUTPATIENT
Start: 2023-10-13 | End: 2023-10-16 | Stop reason: HOSPADM

## 2023-10-12 RX ADMIN — VANCOMYCIN HYDROCHLORIDE 125 MG: 125 CAPSULE ORAL at 14:28

## 2023-10-12 RX ADMIN — METHYLPREDNISOLONE SODIUM SUCCINATE 20 MG: 40 INJECTION, POWDER, FOR SOLUTION INTRAMUSCULAR; INTRAVENOUS at 10:13

## 2023-10-12 RX ADMIN — VANCOMYCIN HYDROCHLORIDE 125 MG: 125 CAPSULE ORAL at 16:06

## 2023-10-12 RX ADMIN — VANCOMYCIN HYDROCHLORIDE 125 MG: 125 CAPSULE ORAL at 10:13

## 2023-10-12 RX ADMIN — METHYLPREDNISOLONE SODIUM SUCCINATE 20 MG: 40 INJECTION, POWDER, FOR SOLUTION INTRAMUSCULAR; INTRAVENOUS at 16:07

## 2023-10-12 RX ADMIN — VANCOMYCIN HYDROCHLORIDE 125 MG: 125 CAPSULE ORAL at 20:56

## 2023-10-12 RX ADMIN — SODIUM CHLORIDE: 9 INJECTION, SOLUTION INTRAVENOUS at 02:30

## 2023-10-12 RX ADMIN — METRONIDAZOLE 500 MG: 500 INJECTION, SOLUTION INTRAVENOUS at 10:13

## 2023-10-12 RX ADMIN — METRONIDAZOLE 500 MG: 500 INJECTION, SOLUTION INTRAVENOUS at 22:04

## 2023-10-12 RX ADMIN — METHYLPREDNISOLONE SODIUM SUCCINATE 20 MG: 40 INJECTION, POWDER, FOR SOLUTION INTRAMUSCULAR; INTRAVENOUS at 00:57

## 2023-10-12 ASSESSMENT — ACTIVITIES OF DAILY LIVING (ADL)
ADLS_ACUITY_SCORE: 18

## 2023-10-12 NOTE — PROGRESS NOTES
[facility]    Medicine Progress Note - Hospitalist Service    Date of Admission:  10/8/2023    Assessment & Plan   Michael Mo is a 37 year old male admitted with a UC flare in the setting of cdiff colitis.  He is clinically stable.  Plan to complete course of abx tomorrow afternoon for cdiff.  Patient received infliximab yesterday and remains on IV steroids for UC.  Advance diet per GI recs.    # Cdiff colitis  # UC flare  - PO vancomycin, metronidazole until 10/13 at noon  - ID and GI following  - discontinue IV fluids given tolerance of PO fluids          Diet: Snacks/Supplements Adult: Ensure Enlive; With Meals  Clear Liquid Diet      Meredith Catheter: Not present  Lines: None     Cardiac Monitoring: None  Code Status: Full Code      Clinically Significant Risk Factors              # Hypoalbuminemia: Lowest albumin = 3 g/dL at 10/8/2023  6:15 AM, will monitor as appropriate             # Severe Malnutrition: based on nutrition assessment           Disposition Plan      Expected Discharge Date: 10/13/2023        Discharge Comments: IV steroids; pending sx; GI            Surinder Smith MD  Hospitalist Service  [facility]  Securely message with [employer] (more info)  Text page via [employer] Paging/Directory   ______________________________________________________________________    Interval History   Reports mild abdominal pain.  Stool frequency is every few hours.  Denies blood in bowel movements.    Physical Exam   Vital Signs: Temp: 98.8  F (37.1  C) Temp src: Oral BP: 117/60 Pulse: 64   Resp: 16 SpO2: 96 % O2 Device: None (Room air)    Weight: 149 lbs 0 oz    Gen: lying in bed in no acute distress  Neuro: alert, conversant  CV:  nl rate, regular rhythm to palpation  Pulm: no acute resp distress  GI:  abdomen soft, non distended, NTTP      Medical Decision Making             Data   Reviewed:  K 4.4  Mg 2.5

## 2023-10-12 NOTE — PLAN OF CARE
Problem: Bowel Disease, Inflammatory (Ulcerative Colitis or Crohn's Disease)  Goal: Optimal Adaptation to Chronic Illness  Outcome: Progressing  Goal: Diarrhea Symptom Relief  Outcome: Progressing  Goal: Absence of Infection Signs and Symptoms  Outcome: Progressing  Intervention: Prevent or Manage Infection  Recent Flowsheet Documentation  Taken 10/12/2023 0103 by Ana Lux RN  Isolation Precautions: enteric precautions maintained  Goal: Optimal Nutrition Delivery  Outcome: Progressing  Goal: Optimal Pain Control and Function  Outcome: Progressing     Goal Outcome Evaluation:  Pt is a/ox4, pt denied any pain, SOB, numbness/tingling, and abdominal discomfort.  PIV infusing NS 125ml/hr.   Continue POC.

## 2023-10-12 NOTE — PROGRESS NOTES
Care Management Follow Up    Length of Stay (days): 4    Expected Discharge Date: 10/14/2023     Concerns to be Addressed:  discharge planning, care progression     Patient plan of care discussed at interdisciplinary rounds: Yes    Anticipated Discharge Disposition: Home     Anticipated Discharge Services:  OP follow up, pending clinical progression  Anticipated Discharge DME:  Per Treatment Team    Patient/family educated on Medicare website which has current facility and service quality ratings:    Education Provided on the Discharge Plan:  Yes, per Treatment Team  Patient/Family in Agreement with the Plan:      Referrals Placed by CM/SW:  None at this time  Private pay costs discussed: Not applicable    Additional Information:  Chart reviewed.  ID has signed off. GI is following, s/p Flex Sigmoidoscopy 01/11/2023.   Anticipate discharge home with OP follow up. Pt is independent at baseline.  Care Management will continue to follow for care progression and potential discharge needs.    Nayeli Zhu RN

## 2023-10-12 NOTE — PROGRESS NOTES
GASTROENTEROLOGY PROGRESS NOTE     SUBJECTIVE: decreased frequency but still with loose stools. Tolerating clear liquid diet      OBJECTIVE:   /60 (BP Location: Right arm)   Pulse 64   Temp 98.8  F (37.1  C) (Oral)   Resp 16   Wt 67.6 kg (149 lb)   SpO2 96%   BMI 21.38 kg/m     Temp (24hrs), Av.2  F (36.8  C), Min:97.9  F (36.6  C), Max:98.8  F (37.1  C)     Patient Vitals for the past 72 hrs:   Weight   10/12/23 0544 67.6 kg (149 lb)   10/11/23 05 69.2 kg (152 lb 9.6 oz)   10/10/23 0524 70.9 kg (156 lb 6.4 oz)        Intake/Output Summary (Last 24 hours) at 10/12/2023 1213  Last data filed at 10/12/2023 0900  Gross per 24 hour   Intake 250 ml   Output --   Net 250 ml      PHYSICAL EXAM   Constitutional: Healthy, in bed, no acute distress  Cardiovascular: Regular rate and rhythm.   Respiratory:respirations non labored.   Abdomen: Soft, non-tender, non-distended, normally active bowel sounds. No masses or hepatosplenomegaly appreciated. No guarding or rebound tenderness.    I have reviewed the patient's new clinical lab results:   Recent Labs   Lab Test 10/10/23  0530 10/09/23  0521 10/08/23  0615 06/14/22  2238 22  1341   WBC 17.0* 15.1* 19.0*   < > 17.5*   HGB 11.7* 12.0* 12.4*   < > 15.5   MCV 81 80 80   < > 80    413 393   < > 367   INR  --   --   --   --  1.12    < > = values in this interval not displayed.      Recent Labs   Lab Test 10/12/23  0524 10/11/23  0549 10/10/23  0530 10/09/23  0521 10/08/23  0615 10/08/23  0244   NA  --   --  142  --  138 139   POTASSIUM 4.4 4.1 3.9   < > 4.0 3.5   CHLORIDE  --   --  108*  --  103 101   CO2  --   --  26  --  26 24   BUN  --   --  13.6  --  6.3 7.4   CR  --   --  1.02  --  1.18* 1.25*   ANIONGAP  --   --  8  --  9 14   INNA  --   --  8.3*  --  8.0* 9.0    < > = values in this interval not displayed.      Recent Labs   Lab Test 10/08/23  0615 10/08/23  0534 10/08/23  0244 23  0737 22  2238 21  2243   ALBUMIN 3.0*  --   3.4* 3.8   < > 3.4*   BILITOTAL 0.3  --  0.4 0.5   < > 0.6   ALT 5  --  6 12   < > 105*   AST 18  --  22 17   < > 60*   ALKPHOS 71  --  82 76   < > 154*   PROTEIN  --  10*  --   --   --   --    LIPASE  --   --  23 70*  --  102*    < > = values in this interval not displayed.      Assessment:     is a 37 year old male with chronic panulcerative colitis ( diagnosed in 2021) and history of c diff in April 2023 who presented to ER for symptoms of worsening abdominal pain and diarrhea- c diff treatment x 5 days then pain worsened prompting ER evaluation. CT shows pan colitis sparing the cecum now responding to steroids, vancomycin has been restarted to complete therapy and recent c diff testing has been negative. He was slowly improving but then increased bowel frequency with watery stools. Flex sig showed severe ulcerative colitis. Remicade 10/mg/kg yesterday with some improvement today.        Pan colitis likely due to ulcerative colitis flare refractory to steroids- Remicade given yesterday with ? Response. Will continue to monitor.     2. C diff infection - continue vancomycin and metronidazole      Plan:    Continue IV steroids, vanco, flagyl.   Low fiber diet   Continue supportive care.   Approximately 20 minutes of total time was spent providing patient care, including patient evaluation, reviewing documentation/test result, and .  Shabana Miller CNP  [facility]   Office

## 2023-10-12 NOTE — PLAN OF CARE
"  Problem: Malnutrition  Goal: Improved Nutritional Intake  Outcome: Progressing   Goal Outcome Evaluation:  Pt only ordering full liquid-textured foods \"just to be safe\". Pt reports that Ensure Enlive upsets his stomach - specifically the whey protein in it, he suspects. Pt open to trying [employer], which uses pea protein instead, and see how that goes before having it scheduled.   "

## 2023-10-12 NOTE — PROGRESS NOTES
"CLINICAL NUTRITION SERVICES - REASSESSMENT NOTE     Nutrition Prescription    RECOMMENDATIONS FOR MDs/PROVIDERS TO ORDER:  None at this time    Malnutrition Status:    Severe malnutrition  In Context of:  Chronic illness or disease    Recommendations already ordered by Registered Dietitian (RD):  Medical food supplement therapy:   - Discontinue Ensure Enlive TID per GI intolerance  - Trial Heather FiberSensing 1.0, ordered via [employer]    Future/Additional Recommendations:  Monitor po intake, Bms      EVALUATION OF THE PROGRESS TOWARD GOALS   Diet: Low Fiber  Nutrition Supplements: Ensure Enlive BID  Intake: Pt NPO or Clear Liquid last 2 days, so intake has been marginal during that time. Was ordering well and consuming 100% prior to this.     NEW FINDINGS   Met with pt in room this afternoon. Pt reports having a decent appetite, and is tolerating his low fiber diet, which advanced from clear liquid this morning. Pt reports only ordering full liquid-textured foods \"just to be safe\". Pt reports that Ensure Enlive upsets his stomach - specifically the whey protein in it, he suspects. Pt open to trying [employer]an, which uses pea protein instead, and see how that goes. Overall, pt is worried about how much wt he's lost since  this UC flare up.     Labs:  Ma.5 (H)    Meds:  Methylprednisolone    GI:  Ongoing diarrhea:  BM x3 on 10/12  BM x5 on 10/11    Anthropometrics:  10/12/23  67.6 kg (149 lb) Standing scale  10/11/23  69.2 kg (152 lb 9.6 oz) Standing scale  10/10/23  70.9 kg (156 lb 6.4 oz) Standing scale  10/09/23 70.2 kg (154 lb 12.8 oz)  23 79.4 kg (175 lb)  22 81.6 kg (180 lb)  22 81.6 kg (180 lb)  Wt continuing to trend downward since admit.    MALNUTRITION (assessed on 10/9)  Severe malnutrition  In Context of:  Chronic illness or disease    Previous Goals   Patient to consume % of nutritionally adequate meals three times per day, or the equivalent with supplements/snacks. - not " met, pt NPO/clears past 2-3 days  Reduce diarrhea - some improvement based on # of Bms/day   Maintain wt at 70kg - not met, pt at 67.6 kg, wt trending down since admit    Previous Nutrition Diagnosis  Malnutrition related to ulcerative colitis with suspected malabsorption and reduced po as evidenced by wt loss, reduced po    Evaluation: No change    CURRENT NUTRITION DIAGNOSIS  Malnutrition related to ulcerative colitis with suspected malabsorption and reduced po as evidenced by wt loss, reduced po      INTERVENTIONS  Implementation  Medical food supplement therapy:   - Discontinue Ensure Enlive TID per GI intolerance  - Trial Heather [employer] 1.0, ordered via [employer]    Goals  - Patient to consume % of nutritionally adequate meal trays TID, or the equivalent with supplements/snacks.  - Reduce diarrhea -    Monitoring/Evaluation  Progress toward goals will be monitored and evaluated per protocol.

## 2023-10-12 NOTE — PLAN OF CARE
Problem: Bowel Disease, Inflammatory (Ulcerative Colitis or Crohn's Disease)  Goal: Optimal Adaptation to Chronic Illness  Outcome: Progressing     Problem: Malnutrition  Goal: Improved Nutritional Intake  Outcome: Progressing   Pt seems in better spirits today.  Pt is tolerating full liquids, no abdominal pain or nausea.  4 watery bm's this shift, no blood noted.  Barrier cream to rectal area for breakdown.

## 2023-10-12 NOTE — PROGRESS NOTES
Chart review  Had flex sig yesterday with ongoing ulcerative colitis flare.  He was started on Remicade.  Little evidence for C. difficile as associated diagnosis.    ID will officially sign off.    Lamont Faulkner MD

## 2023-10-13 PROBLEM — R53.83 OTHER FATIGUE: Status: RESOLVED | Noted: 2023-10-08 | Resolved: 2023-10-13

## 2023-10-13 PROBLEM — R53.1 WEAKNESS: Status: RESOLVED | Noted: 2023-10-08 | Resolved: 2023-10-13

## 2023-10-13 LAB
BACTERIA BLD CULT: NO GROWTH
BACTERIA BLD CULT: NO GROWTH
CRP SERPL-MCNC: <3 MG/L
MAGNESIUM SERPL-MCNC: 2.6 MG/DL (ref 1.7–2.3)
POTASSIUM SERPL-SCNC: 4.4 MMOL/L (ref 3.4–5.3)

## 2023-10-13 PROCEDURE — 83735 ASSAY OF MAGNESIUM: CPT | Performed by: HOSPITALIST

## 2023-10-13 PROCEDURE — 250N000013 HC RX MED GY IP 250 OP 250 PS 637: Performed by: PHYSICIAN ASSISTANT

## 2023-10-13 PROCEDURE — 120N000001 HC R&B MED SURG/OB

## 2023-10-13 PROCEDURE — 250N000011 HC RX IP 250 OP 636: Performed by: NURSE PRACTITIONER

## 2023-10-13 PROCEDURE — 86140 C-REACTIVE PROTEIN: CPT | Performed by: NURSE PRACTITIONER

## 2023-10-13 PROCEDURE — 250N000011 HC RX IP 250 OP 636: Mod: JZ | Performed by: HOSPITALIST

## 2023-10-13 PROCEDURE — 84132 ASSAY OF SERUM POTASSIUM: CPT | Performed by: HOSPITALIST

## 2023-10-13 PROCEDURE — 99232 SBSQ HOSP IP/OBS MODERATE 35: CPT | Performed by: EMERGENCY MEDICINE

## 2023-10-13 PROCEDURE — 250N000013 HC RX MED GY IP 250 OP 250 PS 637: Performed by: INTERNAL MEDICINE

## 2023-10-13 PROCEDURE — 36415 COLL VENOUS BLD VENIPUNCTURE: CPT | Performed by: HOSPITALIST

## 2023-10-13 RX ADMIN — METHYLPREDNISOLONE SODIUM SUCCINATE 20 MG: 40 INJECTION, POWDER, FOR SOLUTION INTRAMUSCULAR; INTRAVENOUS at 01:19

## 2023-10-13 RX ADMIN — ACETAMINOPHEN 650 MG: 325 TABLET ORAL at 08:53

## 2023-10-13 RX ADMIN — ENOXAPARIN SODIUM 40 MG: 100 INJECTION SUBCUTANEOUS at 08:53

## 2023-10-13 RX ADMIN — METHYLPREDNISOLONE SODIUM SUCCINATE 20 MG: 40 INJECTION, POWDER, FOR SOLUTION INTRAMUSCULAR; INTRAVENOUS at 08:53

## 2023-10-13 RX ADMIN — METRONIDAZOLE 500 MG: 500 INJECTION, SOLUTION INTRAVENOUS at 11:31

## 2023-10-13 RX ADMIN — VANCOMYCIN HYDROCHLORIDE 125 MG: 125 CAPSULE ORAL at 08:53

## 2023-10-13 RX ADMIN — METHYLPREDNISOLONE SODIUM SUCCINATE 20 MG: 40 INJECTION, POWDER, FOR SOLUTION INTRAMUSCULAR; INTRAVENOUS at 16:16

## 2023-10-13 ASSESSMENT — ACTIVITIES OF DAILY LIVING (ADL)
ADLS_ACUITY_SCORE: 18

## 2023-10-13 NOTE — PLAN OF CARE
Problem: Plan of Care - These are the overarching goals to be used throughout the patient stay.    Goal: Plan of Care Review  Description: The Plan of Care Review/Shift note should be completed every shift.  The Outcome Evaluation is a brief statement about your assessment that the patient is improving, declining, or no change.  This information will be displayed automatically on your shift note.  Outcome: Progressing     Problem: Bowel Disease, Inflammatory (Ulcerative Colitis or Crohn's Disease)  Goal: Diarrhea Symptom Relief  Outcome: Progressing     Patient is alert and orientated times four. He has denied N/V and pain tonight. Per patient he has had two large watery stools tonight. IV saline locked. Independent in room.

## 2023-10-13 NOTE — PROGRESS NOTES
GASTROENTEROLOGY PROGRESS NOTE     SUBJECTIVE: slowly improving. Vancomycin discontinued. Still on IV steroids. Still with poor appetite. Plans for discharge tomorrow. Stools starting to form up and decreased frequency.      OBJECTIVE:   /65 (BP Location: Right arm)   Pulse 64   Temp 98.4  F (36.9  C) (Oral)   Resp 16   Wt 65.2 kg (143 lb 11.2 oz)   SpO2 95%   BMI 20.62 kg/m     Temp (24hrs), Av.4  F (36.9  C), Min:97.8  F (36.6  C), Max:99.1  F (37.3  C)     Patient Vitals for the past 72 hrs:   Weight   10/13/23 0629 65.2 kg (143 lb 11.2 oz)   10/12/23 0544 67.6 kg (149 lb)   10/11/23 0518 69.2 kg (152 lb 9.6 oz)        Intake/Output Summary (Last 24 hours) at 10/13/2023 1244  Last data filed at 10/13/2023 1000  Gross per 24 hour   Intake 600 ml   Output --   Net 600 ml      PHYSICAL EXAM   Constitutional: Healthy, in bed, no acute distress  Respiratory:  respirations non labored.   Abdomen: Soft, non-tender, non-distended, normally active bowel sounds.     I have reviewed the patient's new clinical lab results:   Recent Labs   Lab Test 10/10/23  0530 10/09/23  0521 10/08/23  0615 22  2238 22  1341   WBC 17.0* 15.1* 19.0*   < > 17.5*   HGB 11.7* 12.0* 12.4*   < > 15.5   MCV 81 80 80   < > 80    413 393   < > 367   INR  --   --   --   --  1.12    < > = values in this interval not displayed.      Recent Labs   Lab Test 10/13/23  0552 10/12/23  0524 10/11/23  0549 10/10/23  0530 10/09/23  0521 10/08/23  0615 10/08/23  0244   NA  --   --   --  142  --  138 139   POTASSIUM 4.4 4.4 4.1 3.9   < > 4.0 3.5   CHLORIDE  --   --   --  108*  --  103 101   CO2  --   --   --  26  --  26 24   BUN  --   --   --  13.6  --  6.3 7.4   CR  --   --   --  1.02  --  1.18* 1.25*   ANIONGAP  --   --   --  8  --  9 14   INNA  --   --   --  8.3*  --  8.0* 9.0    < > = values in this interval not displayed.      Recent Labs   Lab Test 10/08/23  0615 10/08/23  0534 10/08/23  0244 23  0737 22  2852  05/27/21  2243   ALBUMIN 3.0*  --  3.4* 3.8   < > 3.4*   BILITOTAL 0.3  --  0.4 0.5   < > 0.6   ALT 5  --  6 12   < > 105*   AST 18  --  22 17   < > 60*   ALKPHOS 71  --  82 76   < > 154*   PROTEIN  --  10*  --   --   --   --    LIPASE  --   --  23 70*  --  102*    < > = values in this interval not displayed.      Assessment:  28 year old male with c diff resolved and ulcerative colitis flare failed humira and IV steroids now may be responding to Remicade. Appetite is still poor.   Plan:    Remicade 10 mg/kg in 2 weeks then 6 weeks. Our biologic team is working on coverage.   Steroid taper at discharge 40 mg daily x 7d, then 35 mg every day x 7d, then 30 mg every day x 7d, then 25 mg every day x 7d, then 20 mg every day x 7d, then 15 mg every day x 7d, then 10 mg every day x 7d, then 5 mg every day x 7d then off   Low fiber diet   Approximately 25 minutes of total time was spent providing patient care, including patient evaluation, reviewing documentation/test result, and .    Addendum- discussed with Dr. Fabiano Monreal. The patient should remain on IV steroids until he is passing some solid stools.   Shabana Miller [facility]   Office

## 2023-10-13 NOTE — PLAN OF CARE
Problem: Bowel Disease, Inflammatory (Ulcerative Colitis or Crohn's Disease)  Goal: Optimal Adaptation to Chronic Illness  Outcome: Progressing  Goal: Absence of Infection Signs and Symptoms  Intervention: Prevent or Manage Infection  Recent Flowsheet Documentation  Taken 10/12/2023 1600 by Suhas Guidry, RN  Isolation Precautions: enteric precautions maintained  Goal: Optimal Pain Control and Function  Outcome: Progressing   Goal Outcome Evaluation:       Patient alert and oriented. VSS. RA. Saline locked in between antibiotics. Independent in room. Calls appropriately. Denies pain/ nausea. Patient reports 2 episodes of watery stools. Patient tolerating Full Liquid diet. Enteric precautions maintained. Plan of care ongoing.

## 2023-10-13 NOTE — PROGRESS NOTES
[facility] MEDICINE PROGRESS NOTE      Summary:  38 year old male admitted with abdominal pain.  PMHx includes  UC diagnosed in 2021.    Hospital course is marked by steroid support for his UC flare.  Pt has been  Having issues with c diff diagnosed in April.  ID and GI are consultants on the  Case.  On 10/11 he had flex sig showing moderately active UC.  He   Received a first dose of remicade on 10/11.  Microbiology history regarding  His c diff has been reviewed. Per ID he currently is colonized without  Active infection.  He is on oral vancomycin for a planned 10 day total  Treatment course set to complete today.  He is slowly advancing his diet.      Overnight he feels improved.  I will discontinue the vancomycin as per  Chart review he currently is colonized with c diff; not an active infection.  Discontinue enteric precautions.        Hospital day number 5    Problem list:     UC exacerbation: pt feels clinically improved since yesterday;   Stool is more formed; he tolerated low fiber diet this morning;   Using supplements; near future plan will be for a slow prednisone   Taper at discharge along with a redose of remicade with [facility]   In 2 weeks  History of clostridium dificile: notes reviewed; ID signed off;   Today will complete his 10 day oral course of vancomycin    3.  Malnutrition, severe: continue supplements  4.  Disposition:  possible discharge tomorrow pending further   Clinical improvement      Bridgette Og M.D.  [facility]  [facility]  Phone: #[phone]  Securely message with the Vocera Web Console (learn more here)  Text page via [employer] Paging/Directory     Interval History/Subjective: feeling improved overnight;     Physical Exam/Objective:  Temp:  [97.8  F (36.6  C)-99.1  F (37.3  C)] 97.8  F (36.6  C)  Pulse:  [59-75] 59  Resp:  [18] 18  BP: (109-117)/(60-64) 109/61  SpO2:  [96 %-97 %] 97 %  Body mass index is 20.62  kg/m .    GENERAL:  Alert, appears comfortable, in no acute distress, appears stated age   HEAD:  Normocephalic, without obvious abnormality, atraumatic   EYES:  PERRL, conjunctiva/corneas clear, no scleral icterus, EOM's intact   NOSE: Nares normal, septum midline, mucosa normal, no drainage   THROAT: Lips, mucosa, and tongue normal; teeth and gums normal, mouth moist   NECK: Supple, symmetrical, trachea midline   BACK:   Symmetric, no curvature, ROM normal   LUNGS:   Clear to auscultation bilaterally, no rales, rhonchi, or wheezing, symmetric chest rise on inhalation, respirations unlabored   CHEST WALL:  No tenderness or deformity   HEART:  Regular rate and rhythm, S1 and S2 normal, no murmur, rub, or gallop    ABDOMEN:   Soft, non-tender, bowel sounds active all four quadrants, no masses, no organomegaly, no rebound or guarding   EXTREMITIES: Extremities normal, atraumatic, no cyanosis or edema    SKIN: Dry to touch, no exanthems in the visualized areas   NEURO: Alert, oriented x3, moves all four extremities freely   PSYCH: Cooperative, behavior is appropriate      Data reviewed today: I personally reviewed all new medications, labs, imaging/diagnostics reports over the past 24 hours. Pertinent findings include:    Imaging:   No results found for this or any previous visit (from the past 24 hour(s)).    Labs    Medications:   Personally Reviewed.  Medications      enoxaparin ANTICOAGULANT  40 mg Subcutaneous Daily    methylPREDNISolone  20 mg Intravenous Q8H    metroNIDAZOLE  500 mg Intravenous Q12H    vancomycin  125 mg Oral 4x Daily

## 2023-10-13 NOTE — PLAN OF CARE
Problem: Malnutrition  Goal: Improved Nutritional Intake  Outcome: Progressing   Nutritional intake improving today.  Slight nausea with gas discomfort this morning but has since resolved.  Only 2 loose stools today, no blood noted. Pt feels they are having slightly more consistency to them.  Abx discontinued but remains on IV steroids.  Pt is up independently.

## 2023-10-14 LAB
ANION GAP SERPL CALCULATED.3IONS-SCNC: 9 MMOL/L (ref 7–15)
BUN SERPL-MCNC: 17.2 MG/DL (ref 6–20)
CALCIUM SERPL-MCNC: 9.3 MG/DL (ref 8.6–10)
CHLORIDE SERPL-SCNC: 101 MMOL/L (ref 98–107)
CREAT SERPL-MCNC: 1.05 MG/DL (ref 0.67–1.17)
CRP SERPL-MCNC: <3 MG/L
DEPRECATED HCO3 PLAS-SCNC: 30 MMOL/L (ref 22–29)
EGFRCR SERPLBLD CKD-EPI 2021: >90 ML/MIN/1.73M2
ERYTHROCYTE [DISTWIDTH] IN BLOOD BY AUTOMATED COUNT: 14.6 % (ref 10–15)
GLUCOSE SERPL-MCNC: 116 MG/DL (ref 70–99)
HCT VFR BLD AUTO: 42.6 % (ref 40–53)
HGB BLD-MCNC: 13.7 G/DL (ref 13.3–17.7)
MCH RBC QN AUTO: 25.8 PG (ref 26.5–33)
MCHC RBC AUTO-ENTMCNC: 32.2 G/DL (ref 31.5–36.5)
MCV RBC AUTO: 80 FL (ref 78–100)
PLATELET # BLD AUTO: 585 10E3/UL (ref 150–450)
POTASSIUM SERPL-SCNC: 3.7 MMOL/L (ref 3.4–5.3)
RBC # BLD AUTO: 5.3 10E6/UL (ref 4.4–5.9)
SODIUM SERPL-SCNC: 140 MMOL/L (ref 135–145)
WBC # BLD AUTO: 18.2 10E3/UL (ref 4–11)

## 2023-10-14 PROCEDURE — 80048 BASIC METABOLIC PNL TOTAL CA: CPT | Performed by: INTERNAL MEDICINE

## 2023-10-14 PROCEDURE — 99232 SBSQ HOSP IP/OBS MODERATE 35: CPT | Performed by: EMERGENCY MEDICINE

## 2023-10-14 PROCEDURE — 86140 C-REACTIVE PROTEIN: CPT | Performed by: INTERNAL MEDICINE

## 2023-10-14 PROCEDURE — 85027 COMPLETE CBC AUTOMATED: CPT | Performed by: INTERNAL MEDICINE

## 2023-10-14 PROCEDURE — 250N000011 HC RX IP 250 OP 636: Mod: JZ | Performed by: HOSPITALIST

## 2023-10-14 PROCEDURE — 36415 COLL VENOUS BLD VENIPUNCTURE: CPT | Performed by: INTERNAL MEDICINE

## 2023-10-14 PROCEDURE — 120N000001 HC R&B MED SURG/OB

## 2023-10-14 PROCEDURE — 250N000013 HC RX MED GY IP 250 OP 250 PS 637: Performed by: INTERNAL MEDICINE

## 2023-10-14 RX ORDER — HYDROCORTISONE ACETATE 25 MG/1
25 SUPPOSITORY RECTAL 3 TIMES DAILY
Status: DISCONTINUED | OUTPATIENT
Start: 2023-10-14 | End: 2023-10-15

## 2023-10-14 RX ORDER — SIMETHICONE 80 MG
80 TABLET,CHEWABLE ORAL EVERY 6 HOURS PRN
Status: DISCONTINUED | OUTPATIENT
Start: 2023-10-14 | End: 2023-10-14

## 2023-10-14 RX ADMIN — ENOXAPARIN SODIUM 40 MG: 100 INJECTION SUBCUTANEOUS at 09:22

## 2023-10-14 RX ADMIN — HYDROCORTISONE ACETATE 25 MG: 25 SUPPOSITORY RECTAL at 12:25

## 2023-10-14 RX ADMIN — HYDROCORTISONE ACETATE 25 MG: 25 SUPPOSITORY RECTAL at 14:49

## 2023-10-14 RX ADMIN — METHYLPREDNISOLONE SODIUM SUCCINATE 20 MG: 40 INJECTION, POWDER, FOR SOLUTION INTRAMUSCULAR; INTRAVENOUS at 00:03

## 2023-10-14 RX ADMIN — METHYLPREDNISOLONE SODIUM SUCCINATE 20 MG: 40 INJECTION, POWDER, FOR SOLUTION INTRAMUSCULAR; INTRAVENOUS at 09:22

## 2023-10-14 RX ADMIN — HYDROCORTISONE ACETATE 25 MG: 25 SUPPOSITORY RECTAL at 22:07

## 2023-10-14 RX ADMIN — SIMETHICONE 80 MG: 80 TABLET, CHEWABLE ORAL at 00:15

## 2023-10-14 RX ADMIN — METHYLPREDNISOLONE SODIUM SUCCINATE 20 MG: 40 INJECTION, POWDER, FOR SOLUTION INTRAMUSCULAR; INTRAVENOUS at 18:06

## 2023-10-14 ASSESSMENT — ACTIVITIES OF DAILY LIVING (ADL)
ADLS_ACUITY_SCORE: 18

## 2023-10-14 NOTE — PLAN OF CARE
Problem: Bowel Disease, Inflammatory (Ulcerative Colitis or Crohn's Disease)  Goal: Diarrhea Symptom Relief  Intervention: Manage Diarrhea  Recent Flowsheet Documentation  Taken 10/14/2023 0900 by Sera Natarajan RN  Perineal Care: protective cream/ointment applied   Pt had 4 small loose bm's this shift. He states mostly gas.  Gas pains can be uncomfortable otherwise denies pain.  Tolerating a low fiber diet eating 100% of breakfast and lunch.  Pt's weight continues to decrease.  Continues with IV steroids. Pt is up independently. Ambulates in the halls.

## 2023-10-14 NOTE — PLAN OF CARE
Problem: Malnutrition  Goal: Improved Nutritional Intake  Outcome: Progressing     Problem: Bowel Disease, Inflammatory (Ulcerative Colitis or Crohn's Disease)  Goal: Optimal Pain Control and Function  Outcome: Progressing   Goal Outcome Evaluation:    VSS. IV steroids given overnight. Pt reported small loose BM. Passing a lot of gas per pt report. PRN simethicone given. Independent. Raul low fiber diet. Per pt request, minimal disturbances overnight.

## 2023-10-14 NOTE — PROGRESS NOTES
[facility] Digestive Health Progress Note    Assessment and recommendations:  38-year-old man with ulcerative pancolitis previously treated with Humira who presents for ulcerative colitis flare, also found to have C. difficile.    Still having multiple bowel movements overnight with no formed.  No blood at least.  He received Remicade on 10/11/2023.  He has been receiving IV steroids since prior to that.  He attributes some of the multiple bowel movements to gas after drinking a plant-based protein shake.    The question is whether this represents uncontrolled ulcerative proctitis with tenesmus, slow response to or underdosing of Remicade, nonresponse to Remicade, enteric infection like C. difficile (although he did not improve with antibiotics and has not felt worse since stopping antibiotics), or cytomegalovirus.    Stains are still in process from flexible sigmoidoscopy on 10/11/2023.  We will repeat electrolytes and CRP today.  Yesterday CRP was normal.  We will start hydrocortisone suppositories in the event this represents tenesmus.  If he is still having uncontrolled diarrhea tomorrow it may be reasonable to repeat that the Remicade infusion.    Goals for discharge would include less than 6 bowel movements per day and formed bowel movements.    Approximately 30 minutes of total time was spent providing patient care including patient evaluation, reviewing documentation/test results, and .                                                 Jonatan Lozoya MD  Thank you for the opportunity to participate in the care of this patient.   Please feel free to call me with any questions or concerns.  Phone number [phone] or if after 5PM [phone].              ---      Subjective:  Estimates 10-15 bowel movements overnight but having a lot of gas after drinking a pea protein shake.  Abdominal pain.  No blood in stool.  No fever.  Four-point ROS otherwise negative.    Objective:  Vital signs in last 24  hours  Temp:  [98  F (36.7  C)-99  F (37.2  C)] 98  F (36.7  C)  Pulse:  [72-87] 72  Resp:  [15-16] 15  BP: (113-121)/(68-74) 114/71  SpO2:  [94 %-97 %] 97 % O2 Device: None (Room air)      Gen: A&Ox3, NAD  Eyes: No icterus  Pulmonary: Nonlabored  Cardiovascular: Normal rate  Gastrointestinal: Soft, nondistended  Extrem: PPI, no c/c/e      LABORATORY    ELECTROLYTE PANEL   Recent Labs   Lab 10/13/23  0552 10/12/23  0524 10/11/23  0549 10/10/23  0530 10/09/23  0521 10/08/23  0615 10/08/23  0244   NA  --   --   --  142  --  138 139   POTASSIUM 4.4 4.4 4.1 3.9   < > 4.0 3.5   CHLORIDE  --   --   --  108*  --  103 101   CO2  --   --   --  26  --  26 24   GLC  --   --   --  92  --  128* 96   CR  --   --   --  1.02  --  1.18* 1.25*   BUN  --   --   --  13.6  --  6.3 7.4    < > = values in this interval not displayed.      HEMATOLOGY PANEL   Recent Labs   Lab 10/10/23  0530 10/09/23  0521 10/08/23  0615   HGB 11.7* 12.0* 12.4*   MCV 81 80 80   WBC 17.0* 15.1* 19.0*    413 393      LIVER AND PANCREAS PANEL   Recent Labs   Lab 10/08/23  0615 10/08/23  0244   AST 18 22   ALT 5 6   ALKPHOS 71 82   BILITOTAL 0.3 0.4   LIPASE  --  23     IMAGING STUDIES    No new imaging    I have reviewed the current diagnostic and laboratory tests.

## 2023-10-14 NOTE — PROGRESS NOTES
[facility] MEDICINE PROGRESS NOTE      Summary:  38 year old male admitted with abdominal pain.  PMHx includes  UC diagnosed in 2021.    Hospital course is marked by steroid support for his UC flare.  Pt has been  Having issues with c diff diagnosed in April.  ID and GI are consultants on the  Case.  On 10/11 he had flex sig showing moderately active UC.  He   Received a first dose of remicade on 10/11.  Microbiology history regarding  His c diff has been reviewed. Per ID he currently is colonized without  Active infection.  He is on oral vancomycin for a planned 10 day total  Treatment course set to complete today.  He is slowly advancing his diet.      Overnight he feels he relapsed.  He had a pea protein supplement instead  Of whey protein.  He has periods of gassiness and small stools that sometimes last 2 hours.  He is tolerating a low fiber diet.    GI note reviewed.  Clinical goals are for him to have less than 6 stools in  24 hours.  He will receive additional hydrocortisone suppository today  With consideration of repeating his remicaide dose.      Hospital day number 6    Problem list:     UC exacerbation: pt feels clinically improved since yesterday;   Stool is more formed; he tolerated low fiber diet this morning;   Using supplements; near future plan will be for a slow prednisone   Taper at discharge along with a redose of remicade with [facility]   In 2 weeks  History of clostridium dificile: colonized per ID discussion; completed  Oral vancomycin on 10/13  3.  Malnutrition, severe: continue supplements  4.  Disposition:  possible discharge tomorrow pending further   Clinical improvement      Bridgette Og M.D.  [facility] Medicine  [facility]  Phone: #[phone]  Securely message with the Vocera Web Console (learn more here)  Text page via [employer] Paging/Directory     Interval History/Subjective: more gassy overnight with small  Stools  sometimes watery    Physical Exam/Objective:  Temp:  [98  F (36.7  C)-99  F (37.2  C)] 98  F (36.7  C)  Pulse:  [72-87] 72  Resp:  [15-16] 15  BP: (113-121)/(68-74) 114/71  SpO2:  [94 %-97 %] 97 %  Body mass index is 20.93 kg/m .    GENERAL:  Alert, appears comfortable, in no acute distress, appears stated age   HEAD:  Normocephalic, without obvious abnormality, atraumatic   EYES:  PERRL, conjunctiva/corneas clear, no scleral icterus, EOM's intact   NOSE: Nares normal, septum midline, mucosa normal, no drainage   THROAT: Lips, mucosa, and tongue normal; teeth and gums normal, mouth moist   NECK: Supple, symmetrical, trachea midline   BACK:   Symmetric, no curvature, ROM normal   LUNGS:   Clear to auscultation bilaterally, no rales, rhonchi, or wheezing, symmetric chest rise on inhalation, respirations unlabored   CHEST WALL:  No tenderness or deformity   HEART:  Regular rate and rhythm, S1 and S2 normal, no murmur, rub, or gallop    ABDOMEN:   Soft, non-tender, bowel sounds active all four quadrants, no masses, no organomegaly, no rebound or guarding   EXTREMITIES: Extremities normal, atraumatic, no cyanosis or edema    SKIN: Dry to touch, no exanthems in the visualized areas   NEURO: Alert, oriented x3, moves all four extremities freely   PSYCH: Cooperative, behavior is appropriate      Data reviewed today: I personally reviewed all new medications, labs, imaging/diagnostics reports over the past 24 hours. Pertinent findings include:    Imaging:   No results found for this or any previous visit (from the past 24 hour(s)).    Labs    Medications:   Personally Reviewed.  Medications      enoxaparin ANTICOAGULANT  40 mg Subcutaneous Daily    hydrocortisone  25 mg Rectal TID    methylPREDNISolone  20 mg Intravenous Q8H

## 2023-10-15 LAB
ALBUMIN SERPL BCG-MCNC: 3.3 G/DL (ref 3.5–5.2)
ALP SERPL-CCNC: 63 U/L (ref 40–129)
ALT SERPL W P-5'-P-CCNC: 7 U/L (ref 0–70)
ANION GAP SERPL CALCULATED.3IONS-SCNC: 8 MMOL/L (ref 7–15)
AST SERPL W P-5'-P-CCNC: 12 U/L (ref 0–45)
BILIRUB SERPL-MCNC: 0.5 MG/DL
BUN SERPL-MCNC: 20.4 MG/DL (ref 6–20)
CALCIUM SERPL-MCNC: 9 MG/DL (ref 8.6–10)
CHLORIDE SERPL-SCNC: 104 MMOL/L (ref 98–107)
CREAT SERPL-MCNC: 1.04 MG/DL (ref 0.67–1.17)
CRP SERPL-MCNC: <3 MG/L
DEPRECATED HCO3 PLAS-SCNC: 28 MMOL/L (ref 22–29)
EGFRCR SERPLBLD CKD-EPI 2021: >90 ML/MIN/1.73M2
GLUCOSE SERPL-MCNC: 123 MG/DL (ref 70–99)
POTASSIUM SERPL-SCNC: 3.9 MMOL/L (ref 3.4–5.3)
PROT SERPL-MCNC: 6.4 G/DL (ref 6.4–8.3)
SODIUM SERPL-SCNC: 140 MMOL/L (ref 135–145)

## 2023-10-15 PROCEDURE — 36415 COLL VENOUS BLD VENIPUNCTURE: CPT | Performed by: INTERNAL MEDICINE

## 2023-10-15 PROCEDURE — 99232 SBSQ HOSP IP/OBS MODERATE 35: CPT | Performed by: HOSPITALIST

## 2023-10-15 PROCEDURE — 86140 C-REACTIVE PROTEIN: CPT | Performed by: INTERNAL MEDICINE

## 2023-10-15 PROCEDURE — 80053 COMPREHEN METABOLIC PANEL: CPT | Performed by: INTERNAL MEDICINE

## 2023-10-15 PROCEDURE — 250N000011 HC RX IP 250 OP 636: Mod: JZ | Performed by: HOSPITALIST

## 2023-10-15 PROCEDURE — 250N000012 HC RX MED GY IP 250 OP 636 PS 637: Performed by: INTERNAL MEDICINE

## 2023-10-15 PROCEDURE — 250N000013 HC RX MED GY IP 250 OP 250 PS 637: Performed by: INTERNAL MEDICINE

## 2023-10-15 PROCEDURE — 120N000001 HC R&B MED SURG/OB

## 2023-10-15 RX ORDER — PREDNISONE 20 MG/1
20 TABLET ORAL ONCE
Status: COMPLETED | OUTPATIENT
Start: 2023-10-15 | End: 2023-10-15

## 2023-10-15 RX ORDER — PREDNISONE 20 MG/1
40 TABLET ORAL DAILY
Status: DISCONTINUED | OUTPATIENT
Start: 2023-10-16 | End: 2023-10-16 | Stop reason: HOSPADM

## 2023-10-15 RX ORDER — HYDROCORTISONE ACETATE 25 MG/1
25 SUPPOSITORY RECTAL 2 TIMES DAILY
Status: DISCONTINUED | OUTPATIENT
Start: 2023-10-15 | End: 2023-10-16 | Stop reason: HOSPADM

## 2023-10-15 RX ADMIN — METHYLPREDNISOLONE SODIUM SUCCINATE 20 MG: 40 INJECTION, POWDER, FOR SOLUTION INTRAMUSCULAR; INTRAVENOUS at 01:19

## 2023-10-15 RX ADMIN — HYDROCORTISONE ACETATE 25 MG: 25 SUPPOSITORY RECTAL at 21:14

## 2023-10-15 RX ADMIN — PREDNISONE 20 MG: 20 TABLET ORAL at 17:42

## 2023-10-15 RX ADMIN — METHYLPREDNISOLONE SODIUM SUCCINATE 20 MG: 40 INJECTION, POWDER, FOR SOLUTION INTRAMUSCULAR; INTRAVENOUS at 08:53

## 2023-10-15 RX ADMIN — HYDROCORTISONE ACETATE 25 MG: 25 SUPPOSITORY RECTAL at 08:52

## 2023-10-15 ASSESSMENT — ACTIVITIES OF DAILY LIVING (ADL)
ADLS_ACUITY_SCORE: 18

## 2023-10-15 NOTE — PLAN OF CARE
Problem: Plan of Care - These are the overarching goals to be used throughout the patient stay.    Goal: Optimal Comfort and Wellbeing  Outcome: Progressing     Patient A&O, VSS, and CMS intact. Denies pain. 5 semi-formed stools throughout the day. Independent in room. Call light appropriate.   Sadia Katz RN

## 2023-10-15 NOTE — PLAN OF CARE
Problem: Bowel Disease, Inflammatory (Ulcerative Colitis or Crohn's Disease)  Goal: Optimal Pain Control and Function  Outcome: Progressing     Problem: Bowel Disease, Inflammatory (Ulcerative Colitis or Crohn's Disease)  Goal: Diarrhea Symptom Relief  Outcome: Progressing   Goal Outcome Evaluation:    VSS. Denies pain. Reports improvement of gas discomfort. Loose stool x 4 overnight per pt report. Independent in room. IV solumedrol given. Plan to discharge pending improvement in loose stools.

## 2023-10-15 NOTE — PLAN OF CARE
Pt passed 2 loose stools during shift. Tolerating low fiber diet. Possible discharge in 1-2 days.    Problem: Plan of Care - These are the overarching goals to be used throughout the patient stay.    Goal: Absence of Hospital-Acquired Illness or Injury  Intervention: Identify and Manage Fall Risk  Recent Flowsheet Documentation  Taken 10/14/2023 1800 by Sera Menchaca RN  Safety Promotion/Fall Prevention:   room near nurse's station   room organization consistent   safety round/check completed   Goal Outcome Evaluation:

## 2023-10-15 NOTE — PROGRESS NOTES
[facility] Digestive Health Progress Note    Assessment and recommendations:  38-year-old man with ulcerative pancolitis previously treated with Humira who presents for ulcerative colitis flare, also found to have C. difficile.    Stool is more formed today.  3 bowel movements since midnight.  No abdominal pain.  No hematochezia.     -Transition to oral prednisone 20 mg today and 40 mg/day tomorrow  -Prednisone taper at discharge which will be discussed tomorrow  -Continue hydrocortisone suppositories twice daily for 1 week  -In my opinion I do not think he needs Solimene therapy anymore as this will be supplanted by steroids and infliximab.  He will send a message to his outpatient IBD team.    Anticipate discharge tomorrow assuming he remains stable to improved.    Approximately 30 minutes of total time was spent providing patient care including patient evaluation, reviewing documentation/test results, and .                                                 Jonatan Lozoya MD  Thank you for the opportunity to participate in the care of this patient.   Please feel free to call me with any questions or concerns.  Phone number [phone] or if after 5PM [phone].              ---      Subjective:  3 bowel movement since midnight.  More formed.  Brown.  Nonbloody.  Appetite good.  Has some questions regarding prednisone taper, infliximab, and mesalamine therapies.  10 point ROS otherwise negative.    Objective:  Vital signs in last 24 hours  Temp:  [97.9  F (36.6  C)-98.5  F (36.9  C)] 98.5  F (36.9  C)  Pulse:  [83-85] 85  Resp:  [16-17] 17  BP: (111-112)/(57-68) 111/68  SpO2:  [96 %-98 %] 98 % O2 Device: None (Room air)      Gen: A&Ox3, NAD  Eyes: No icterus  Pulmonary: Nonlabored  Cardiovascular: Regular rate  Gastrointestinal: Nondistended  Extrem: Well perfused      LABORATORY    ELECTROLYTE PANEL   Recent Labs   Lab 10/15/23  0740 10/14/23  1108 10/13/23  0552 10/11/23  0549 10/10/23  0530     140  --   --  142   POTASSIUM 3.9 3.7 4.4   < > 3.9   CHLORIDE 104 101  --   --  108*   CO2 28 30*  --   --  26   * 116*  --   --  92   CR 1.04 1.05  --   --  1.02   BUN 20.4* 17.2  --   --  13.6    < > = values in this interval not displayed.      HEMATOLOGY PANEL   Recent Labs   Lab 10/14/23  1108 10/10/23  0530 10/09/23  0521   HGB 13.7 11.7* 12.0*   MCV 80 81 80   WBC 18.2* 17.0* 15.1*   * 421 413      LIVER AND PANCREAS PANEL   Recent Labs   Lab 10/15/23  0740   AST 12   ALT 7   ALKPHOS 63   BILITOTAL 0.5     IMAGING STUDIES    No new imaging    I have reviewed the current diagnostic and laboratory tests.

## 2023-10-15 NOTE — PROGRESS NOTES
[facility] MEDICINE PROGRESS NOTE      Summary:  38 year old male admitted with abdominal pain.  PMHx includes UC diagnosed in 2021. [facility] following.     Hospital course is marked by steroid support for his UC flare.  Pt has been having issues with c diff diagnosed in April.  ID and GI are consultants on the case.  On 10/11 he had flex sig showing moderately active UC.  He received a first dose of remicade on 10/11.  Microbiology history regarding his c diff has been reviewed. Per ID he currently is colonized without active infection.  He is on oral vancomycin for a planned 10 day total treatment course set to complete today.  He is slowly advancing his diet.      GI note reviewed.  Clinical goals are for him to have less than 6 stools in 24 hours.  As of 10/15/23, he is still having more than 6 stools in 24-hrs. [facility] is in consideration of dosing another administration of remicaide dose. Continue course otherwise until to goal of clinical improvement as outlined by GI.      Hospital day number 7    Problem list:    UC exacerbation/flare-up: Possible redose of remicade with [facility]; continue current cares ; goal is < 6 Bms in 24-hr (not there yet)   History of clostridium dificile: colonized per ID discussion; completed  Oral vancomycin on 10/13  3.  Malnutrition, severe: continue supplements  4.  Disposition:  < 6 Bms in 24-hr and correlating clinical improvement.       Sushil Calderon MD  Internal Medicine  Hospitalist  [facility]  Phone: #[phone]  Securely message with the [employer]oc[employer] Web Console (learn more here)  Text page via [employer] Paging/Directory       Interval History/Subjective:   No acute events overnight.    No report of chest pain, shortness of breath, or other new complaints. Discussed plan of care with patient. Answered all questions to patient's verbalized understanding and satisfaction.    Physical Exam/Objective:  Temp:  [97.9  F (36.6  C)-98  F  (36.7  C)] 97.9  F (36.6  C)  Pulse:  [72-83] 83  Resp:  [15-16] 16  BP: (112-114)/(57-71) 112/57  SpO2:  [96 %-97 %] 96 %  Body mass index is 20.93 kg/m .    GENERAL:  Alert, appears comfortable, in no acute distress, appears stated age   HEAD:  Normocephalic, without obvious abnormality, atraumatic   EYES:  PERRL, conjunctiva/corneas clear, no scleral icterus, EOM's intact   NOSE: Nares normal, septum midline, mucosa normal, no drainage   THROAT: Lips, mucosa, and tongue normal; teeth and gums normal, mouth moist   NECK: Supple, symmetrical, trachea midline   BACK:   Symmetric, no curvature, ROM normal   LUNGS:   Clear to auscultation bilaterally, no rales, rhonchi, or wheezing, symmetric chest rise on inhalation, respirations unlabored   CHEST WALL:  No tenderness or deformity   HEART:  Regular rate and rhythm, S1 and S2 normal, no murmur, rub, or gallop    ABDOMEN:   Soft, non-tender, bowel sounds active all four quadrants, no masses, no organomegaly, no rebound or guarding   EXTREMITIES: Extremities normal, atraumatic, no cyanosis or edema    SKIN: Dry to touch, no exanthems in the visualized areas   NEURO: Alert, oriented x 4, moves all four extremities freely/spontaneously   PSYCH: Cooperative, behavior is appropriate      MDM   39 MINUTES SPENT BY ME on the date of service doing chart review, history, exam, documentation & further activities per the note.      Data reviewed today: I personally reviewed all new medications, labs, imaging/diagnostics reports over the past 24 hours. Pertinent findings include:    Imaging:   No results found for this or any previous visit (from the past 24 hour(s)).    Labs    Medications:   Personally Reviewed.  Medications      enoxaparin ANTICOAGULANT  40 mg Subcutaneous Daily    hydrocortisone  25 mg Rectal TID    methylPREDNISolone  20 mg Intravenous Q8H

## 2023-10-16 VITALS
SYSTOLIC BLOOD PRESSURE: 117 MMHG | OXYGEN SATURATION: 98 % | TEMPERATURE: 98.8 F | DIASTOLIC BLOOD PRESSURE: 64 MMHG | BODY MASS INDEX: 21.11 KG/M2 | WEIGHT: 147.1 LBS | HEART RATE: 83 BPM | RESPIRATION RATE: 16 BRPM

## 2023-10-16 LAB
CRP SERPL-MCNC: <3 MG/L
ERYTHROCYTE [DISTWIDTH] IN BLOOD BY AUTOMATED COUNT: 14.7 % (ref 10–15)
HCT VFR BLD AUTO: 39.7 % (ref 40–53)
HGB BLD-MCNC: 12.8 G/DL (ref 13.3–17.7)
MCH RBC QN AUTO: 25.9 PG (ref 26.5–33)
MCHC RBC AUTO-ENTMCNC: 32.2 G/DL (ref 31.5–36.5)
MCV RBC AUTO: 80 FL (ref 78–100)
PATH REPORT.COMMENTS IMP SPEC: NORMAL
PATH REPORT.FINAL DX SPEC: NORMAL
PATH REPORT.GROSS SPEC: NORMAL
PATH REPORT.MICROSCOPIC SPEC OTHER STN: NORMAL
PATH REPORT.RELEVANT HX SPEC: NORMAL
PHOTO IMAGE: NORMAL
PLATELET # BLD AUTO: 473 10E3/UL (ref 150–450)
RBC # BLD AUTO: 4.94 10E6/UL (ref 4.4–5.9)
WBC # BLD AUTO: 22.9 10E3/UL (ref 4–11)

## 2023-10-16 PROCEDURE — 250N000012 HC RX MED GY IP 250 OP 636 PS 637: Performed by: INTERNAL MEDICINE

## 2023-10-16 PROCEDURE — 85027 COMPLETE CBC AUTOMATED: CPT | Performed by: INTERNAL MEDICINE

## 2023-10-16 PROCEDURE — 99239 HOSP IP/OBS DSCHRG MGMT >30: CPT | Performed by: HOSPITALIST

## 2023-10-16 PROCEDURE — 86140 C-REACTIVE PROTEIN: CPT | Performed by: INTERNAL MEDICINE

## 2023-10-16 PROCEDURE — 36415 COLL VENOUS BLD VENIPUNCTURE: CPT | Performed by: INTERNAL MEDICINE

## 2023-10-16 RX ORDER — PREDNISONE 5 MG/1
TABLET ORAL
Qty: 252 TABLET | Refills: 0 | Status: SHIPPED | OUTPATIENT
Start: 2023-10-17 | End: 2023-12-12

## 2023-10-16 RX ADMIN — PREDNISONE 40 MG: 20 TABLET ORAL at 10:59

## 2023-10-16 ASSESSMENT — ACTIVITIES OF DAILY LIVING (ADL)
ADLS_ACUITY_SCORE: 18

## 2023-10-16 NOTE — PROGRESS NOTES
GI PROGRESS NOTE  10/16/2023  Michael Mo  1985  /-23    Subjective:     Feeling well today.  Passing brown, nonbloody stools.Casey scale 5.  Tolerating diet without nausea, vomiting or diarrhea.    Objective:     Blood pressure 117/64, pulse 83, temperature 98.8  F (37.1  C), temperature source Oral, resp. rate 16, weight 66.7 kg (147 lb 1.6 oz), SpO2 98%.    Body mass index is 21.11 kg/m .  Gen: NAD  Cardio: RRR  GI: Non-distended, BS positive, soft, non-tender  Neuro: Alert and orientated  Skin: No jaundice     Laboratory:  BMP  Recent Labs   Lab 10/15/23  0740 10/14/23  1108 10/13/23  0552 10/11/23  0549 10/10/23  0530    140  --   --  142   POTASSIUM 3.9 3.7 4.4   < > 3.9   CHLORIDE 104 101  --   --  108*   INNA 9.0 9.3  --   --  8.3*   CO2 28 30*  --   --  26   BUN 20.4* 17.2  --   --  13.6   CR 1.04 1.05  --   --  1.02   * 116*  --   --  92    < > = values in this interval not displayed.     CBC  Recent Labs   Lab 10/16/23  0639 10/14/23  1108 10/10/23  0530   WBC 22.9* 18.2* 17.0*   RBC 4.94 5.30 4.52   HGB 12.8* 13.7 11.7*   HCT 39.7* 42.6 36.7*   MCV 80 80 81   MCH 25.9* 25.8* 25.9*   MCHC 32.2 32.2 31.9   RDW 14.7 14.6 14.7   * 585* 421     INRNo lab results found in last 7 days.   LFTs  Recent Labs   Lab Test 10/15/23  0740 10/08/23  0615 10/08/23  0534 10/08/23  0244 07/08/23  0737 06/14/22 2238 05/27/21  2243   ALBUMIN 3.3* 3.0*  --  3.4* 3.8   < > 3.4*   BILITOTAL 0.5 0.3  --  0.4 0.5   < > 0.6   ALT 7 5  --  6 12   < > 105*   AST 12 18  --  22 17   < > 60*   PROTEIN  --   --  10*  --   --   --   --    LIPASE  --   --   --  23 70*  --  102*    < > = values in this interval not displayed.        Assessment:   Ulcerative colitis  38-year-old male with ulcerative pancolitis previously treated with Humira who presented with UC flare, treated with IV steroids with improvement in symptoms.  He was transition to oral steroids yesterday, and continues to do well  clinically.  He is tolerating a low fiber diet, and is excited to go home.     Plan:   1.  Oral prednisone taper: 40 mg oral prednisone x7 days, 35mg daily x1 week, then 30mg daily x1 week, then 25 x 1 week, then 20 x 1 week, then 15mg x1 week, then 10 mg x 1 week and finally 5mg x1 week.  2.  Keep video visit appointment 10/26/2023  3.  Continue Humira at this time.  4.  Reasonable from our standpoint for discharge home today.    We will sign off, please call with questions.      22 minutes of total time was spent providing patient care, including patient evaluation, reviewing documentation/test results, , and documentation.                                                                        Heather Calvillo PA-C  Thank you for the opportunity to participate in the care of this patient.   Please feel free to call me with any questions or concerns.  Phone number [phone].

## 2023-10-16 NOTE — PROGRESS NOTES
Care Management Discharge Note    Discharge Date: 10/16/2023       Discharge Disposition: Home    Discharge Services: None    Discharge DME: None    Discharge Transportation:      Private pay costs discussed: Not applicable    Does the patient's insurance plan have a 3 day qualifying hospital stay waiver?  No    PAS Confirmation Code: N/A  Patient/family educated on Medicare website which has current facility and service quality ratings: no    Education Provided on the Discharge Plan: No  Persons Notified of Discharge Plans: Pt   Patient/Family in Agreement with the Plan: yes    Handoff Referral Completed: No    Additional Information:  Chart reviewed. No CM needs identified.  Pt discharging home today.  Family to transport.     DEANDRE Rai

## 2023-10-16 NOTE — DISCHARGE SUMMARY
[facility]  Hospitalist Discharge Summary      Date of Admission:  10/8/2023  Date of Discharge:  10/16/2023  Discharging Provider: Sushil Calderon MD  Discharge Service: Hospitalist Service    Discharge Diagnoses   UC exacerbation/flare-up  History of clostridium difficile  Malnutrition, severe:     Clinically Significant Risk Factors     # Severe Malnutrition: based on nutrition assessment      Follow-ups Needed After Discharge   Follow-up Appointments     Follow-up and recommended labs and tests       Follow up with primary care provider, CARLOTTA SINGER, within 7 days for   hospital follow- up.    Follow-up with [facility] as scheduled.            Unresulted Labs Ordered in the Past 30 Days of this Admission       Date and Time Order Name Status Description    10/11/2023  2:56 PM Surgical Pathology Exam In process         These results will be [place] up [place] [facility] [place] [facility]    Discharge Disposition   Discharged to home  Condition at discharge: Good    Hospital Course   38 year old male admitted with abdominal pain.  PMHx includes UC diagnosed in 2021. [facility] consulted and guided treatment.      Hospital course is marked by steroid support for his UC flare.  Pt has been having issues with c diff diagnosed in April.  ID and GI are consultants on the case.  On 10/11 he had flex sig showing moderately active UC.  He received a first dose of remicade on 10/11.  Microbiology history regarding his c diff has been reviewed. Per ID he currently is colonized without active infection.  He is on oral vancomycin for a planned 10 day total treatment course set to completed while in the hospital. Transitioned to PO steroids prednisone with an extended taper on 10/16/23 and cleared for discharge from GI standpoint. Patient doing well clinically with reduced BMs to goal and will discharge with PCP and GI follow-up.     Consultations This Hospital Stay   GASTROENTEROLOGY IP CONSULT  INFECTIOUS DISEASES IP  CONSULT  INFECTIOUS DISEASES IP CONSULT    Code Status   Full Code    Time Spent on this Encounter   I, Sushil Calderon MD, personally saw the patient today and spent greater than 30 minutes discharging this patient.       Sushil Calderon MD  [address] [address]  Phone: [phone]  Fax: [phone]  ______________________________________________________________________    Physical Exam   Vital Signs: Temp: 98.8  F (37.1  C) Temp src: Oral BP: 117/64 Pulse: 83   Resp: 16 SpO2: 98 % O2 Device: None (Room air)    Weight: 147 lbs 1.6 oz  GENERAL:  Alert, appears comfortable, in no acute distress, appears stated age   HEAD:  Normocephalic, without obvious abnormality, atraumatic   EYES:  PERRL, conjunctiva/corneas clear, no scleral icterus, EOM's intact   NOSE: Nares normal, septum midline, mucosa normal, no drainage   THROAT: Lips, mucosa, and tongue normal; teeth and gums normal, mouth moist   NECK: Supple, symmetrical, trachea midline   BACK:   Symmetric, no curvature, ROM normal   LUNGS:   Clear to auscultation bilaterally, no rales, rhonchi, or wheezing, symmetric chest rise on inhalation, respirations unlabored   CHEST WALL:  No tenderness or deformity   HEART:  Regular rate and rhythm, S1 and S2 normal, no murmur, rub, or gallop    ABDOMEN:   Soft, now minimal to non-tender, bowel sounds active all four quadrants, no masses, no organomegaly, no rebound or guarding   EXTREMITIES: Extremities normal, atraumatic, no cyanosis or edema    SKIN: Dry to touch, no exanthems in the visualized areas   NEURO: Alert, oriented x 4, moves all four extremities freely/spontaneously   PSYCH: Cooperative, behavior is appropriate         Primary Care Physician   CARLOTTA SINGER    Discharge Orders      Reason for your hospital stay    Inflammatory bowel disease acute exacerbation / flare-up; evaluations by hospital medicine and gastroenterology ([facility]).      Follow-up and recommended labs and tests     Follow up with primary care provider, CARLOTTA SINGER, within 7 days for hospital follow- up.    Follow-up with [facility] as scheduled.     Activity    Your activity upon discharge: activity as tolerated     Diet    Follow this diet upon discharge: Orders Placed This Encounter      Low Fiber Diet       Significant Results and Procedures   Most Recent 3 CBC's:  Recent Labs   Lab Test 10/16/23  0639 10/14/23  1108 10/10/23  0530   WBC 22.9* 18.2* 17.0*   HGB 12.8* 13.7 11.7*   MCV 80 80 81   * 585* 421     Most Recent 3 BMP's:  Recent Labs   Lab Test 10/15/23  0740 10/14/23  1108 10/13/23  0552 10/11/23  0549 10/10/23  0530    140  --   --  142   POTASSIUM 3.9 3.7 4.4   < > 3.9   CHLORIDE 104 101  --   --  108*   CO2 28 30*  --   --  26   BUN 20.4* 17.2  --   --  13.6   CR 1.04 1.05  --   --  1.02   ANIONGAP 8 9  --   --  8   INNA 9.0 9.3  --   --  8.3*   * 116*  --   --  92    < > = values in this interval not displayed.     Most Recent 2 LFT's:  Recent Labs   Lab Test 10/15/23  0740 10/08/23  0615   AST 12 18   ALT 7 5   ALKPHOS 63 71   BILITOTAL 0.5 0.3     Most Recent 3 INR's:  Recent Labs   Lab Test 06/13/22  1341   INR 1.12   ,   Results for orders placed or performed during the hospital encounter of 10/08/23   CT Abdomen Pelvis w Contrast    Narrative    EXAM: CT ABDOMEN PELVIS W CONTRAST  LOCATION: [facility]  DATE: 10/8/2023    INDICATION: n d.  New diagnosis of C. difficile.  Worsening bloody diarrhea.  History of UC.  COMPARISON: 11/08/2023  TECHNIQUE: CT scan of the abdomen and pelvis was performed following injection of IV contrast. Multiplanar reformats were obtained. Dose reduction techniques were used.  CONTRAST: 100ml Isovue 370    FINDINGS:   LOWER CHEST: Normal.    HEPATOBILIARY: No significant mass or bile duct dilatation. No calcified gallstones.     PANCREAS: No significant mass, duct dilatation, or  inflammatory change.    SPLEEN: Normal size.    ADRENAL GLANDS: No significant nodules.    KIDNEYS/BLADDER: No significant mass, stone, or hydronephrosis.    BOWEL: Mural thickening is seen throughout the ascending transverse descending and sigmoid colon to the level of the rectum with relative sparing of the cecum and terminal ileum. A pericolonic stranding is noted most pronounced in the rectum and sigmoid   colon. No organized pericolonic fluid collections are identified. There are a few prominent fluid-filled loops of proximal jejunum in the left upper quadrant of the abdomen, without small bowel obstruction.    LYMPH NODES: Increased number of subcentimeter mesenteric lymph nodes are noted.    VASCULATURE: No abdominal aortic aneurysm.    PELVIC ORGANS: No pelvic masses.    MUSCULOSKELETAL: Unremarkable.      Impression    IMPRESSION:   1.  Mural thickening seen throughout the colon with relative sparing of the cecum, with pericolonic stranding noted, which may reflect inflammatory and/or infectious colitis given patient's history. There is no evidence of colonic perforation, or   pericolonic abscess at this time.  2.  A few prominent loops of proximal jejunum with decompression gradually, favored reflect focal ileus and/or enteritis.  3.  Subcentimeter mesenteric lymphadenopathy, favored to be reactive in nature       Discharge Medications   Current Discharge Medication List        START taking these medications    Details   predniSONE (DELTASONE) 5 MG tablet Take 8 tablets (40 mg) by mouth daily for 7 days, THEN 7 tablets (35 mg) daily for 7 days, THEN 6 tablets (30 mg) daily for 7 days, THEN 5 tablets (25 mg) daily for 7 days, THEN 4 tablets (20 mg) daily for 7 days, THEN 3 tablets (15 mg) daily for 7 days, THEN 2 tablets (10 mg) daily for 7 days, THEN 1 tablet (5 mg) daily for 7 days. This is the prednisone taper as per [facility].  Qty: 252 tablet, Refills: 0    Comments: This is the prednisone taper as per [facility].  Associated Diagnoses: Ulcerative colitis with rectal bleeding, unspecified location (H); C. difficile colitis           CONTINUE these medications which have NOT CHANGED    Details   adalimumab (HUMIRA *CF*) 40 MG/0.4ML pen kit Inject 40 mg Subcutaneous every 7 days      vitamin D2 (ERGOCALCIFEROL) 93250 units (1250 mcg) capsule Take 50,000 Units by mouth once a week           STOP taking these medications       balsalazide (COLAZAL) 750 MG capsule Comments:   Reason for Stopping:         balsalazide (COLAZAL) 750 MG capsule Comments:   Reason for Stopping:         vancomycin (VANCOCIN) 125 MG capsule Comments:   Reason for Stopping:             Allergies   Allergies   Allergen Reactions    Blood-Group Specific Substance      Weak warm autoantibody identified. Expect possible delay in blood for transfusion. Draw at least 2 large lavender tops for type and screen requests.

## 2023-10-16 NOTE — PLAN OF CARE
Problem: Plan of Care - These are the overarching goals to be used throughout the patient stay.    Goal: Plan of Care Review  Outcome: Progressing     Problem: Bowel Disease, Inflammatory (Ulcerative Colitis or Crohn's Disease)  Goal: Diarrhea Symptom Relief  Outcome: Progressing   Goal Outcome Evaluation:  Patient A&O x4, able to make needs known. VSS on RA. Denied pain throughout shift. Left PIV SL. 3 soft, semi-formed stools during the shift. Reports mild gas discomfort. Low fiber diet, tolerating well. Independent with activity. Alarms off. Discharge pending.

## 2023-10-16 NOTE — PLAN OF CARE
Goal Outcome Evaluation:  Pt is alert & pleasant & able to make needs known. Pt denies current symptoms this morning. He states he'd already been evaluated and cleared by the MD when I rounded on him. He allowed me to complete my assessment, and we preemptively removed his IV given his pending discharge. Pt waited patiently for his discharge paperwork (took a few hours for the orders to process). Denies questions on discharge paperwork.

## 2023-12-24 ENCOUNTER — HEALTH MAINTENANCE LETTER (OUTPATIENT)
Age: 38
End: 2023-12-24

## 2025-01-18 ENCOUNTER — HEALTH MAINTENANCE LETTER (OUTPATIENT)
Age: 40
End: 2025-01-18

## 2025-03-28 ENCOUNTER — HOSPITAL ENCOUNTER (EMERGENCY)
Facility: CLINIC | Age: 40
Discharge: HOME | End: 2025-03-28
Admitting: PHYSICIAN ASSISTANT
Payer: COMMERCIAL

## 2025-03-28 VITALS
SYSTOLIC BLOOD PRESSURE: 130 MMHG | DIASTOLIC BLOOD PRESSURE: 75 MMHG | TEMPERATURE: 97.6 F | HEART RATE: 85 BPM | BODY MASS INDEX: 27.2 KG/M2 | OXYGEN SATURATION: 98 % | RESPIRATION RATE: 18 BRPM | WEIGHT: 190 LBS | HEIGHT: 70 IN

## 2025-03-28 DIAGNOSIS — R05.9 COUGH: ICD-10-CM

## 2025-03-28 DIAGNOSIS — R09.81 NASAL CONGESTION: ICD-10-CM

## 2025-03-28 DIAGNOSIS — J06.9 VIRAL URI: ICD-10-CM

## 2025-03-28 LAB
FLUAV RNA SPEC QL NAA+PROBE: NEGATIVE
FLUBV RNA RESP QL NAA+PROBE: NEGATIVE
RSV RNA SPEC NAA+PROBE: NEGATIVE
SARS-COV-2 RNA RESP QL NAA+PROBE: NEGATIVE

## 2025-03-28 PROCEDURE — 99283 EMERGENCY DEPT VISIT LOW MDM: CPT

## 2025-03-28 PROCEDURE — 87637 SARSCOV2&INF A&B&RSV AMP PRB: CPT | Performed by: PHYSICIAN ASSISTANT

## 2025-03-28 RX ORDER — BENZONATATE 200 MG/1
200 CAPSULE ORAL 3 TIMES DAILY PRN
Qty: 20 CAPSULE | Refills: 0 | Status: SHIPPED | OUTPATIENT
Start: 2025-03-28

## 2025-03-28 ASSESSMENT — COLUMBIA-SUICIDE SEVERITY RATING SCALE - C-SSRS
2. HAVE YOU ACTUALLY HAD ANY THOUGHTS OF KILLING YOURSELF IN THE PAST MONTH?: NO
6. HAVE YOU EVER DONE ANYTHING, STARTED TO DO ANYTHING, OR PREPARED TO DO ANYTHING TO END YOUR LIFE?: NO
1. IN THE PAST MONTH, HAVE YOU WISHED YOU WERE DEAD OR WISHED YOU COULD GO TO SLEEP AND NOT WAKE UP?: NO

## 2025-03-28 NOTE — DISCHARGE INSTRUCTIONS
You were seen in the emergency department for evaluation of your symptoms.  Your symptoms are mostconsistent with a viral infection.   COVID/Influenza/RSV testing is negative  For nasal congestion/ear pain:  -Make sure you drink plenty of fluids to keep mucous thin.   -Consider using a Neti Pot(Available over the counter at [employer]) to help flush sinuses. Do this in the shower to prevent making a mess.  -Use Fluticasone (Flonase) nasal spray (Available over the counter at [employer]).    For sore throats:  -Use Cepacol throat lozenges. These work better than sprays or mouth washes, as you get the medication for a longer amount of time because you are sucking on the medication. This works to numb the back of your throat. You may get these at [employer].    For cough:  -Use Tessalon pearls as prescribed.   -You can also use over the counter Robitussin.    For headaches, pain, fevers:  -Use Ibuprofen 600 mg and Tylenol 650 mg every6-8 hours as needed. Always take ibuprofen with food to prevent stomach upset.    Follow up in clinic in 3-5 days (virtual is OK) to make sure your symptoms are improving.  Return to the emergency department if you develop new/worsening fevers, chest pain, shortness of breath, oxygen less than 91%, worsening/any other concerning symptoms. We would be happy to see you.

## 2025-03-28 NOTE — Clinical Note
Michael Mo was seen and treated in our emergency department on 3/28/2025.  He may return to work on 03/31/2025.       If you have any questions or concerns, please don't hesitate to call.      Elisa Witt PA-C

## 2025-03-28 NOTE — ED PROVIDER NOTES
EMERGENCY DEPARTMENT ENCOUNTER      NAME: Michael Mo  AGE: 39 year old male  YOB: 1985  MRN: [id number]  EVALUATION DATE & TIME: No admission date for patient encounter.    PCP: Palomo Dillard    ED PROVIDER: Elisa Witt PA-C      Chief Complaint   Patient presents with    Cough    Nasal Congestion    Generalized Body Aches         FINAL IMPRESSION:  1. Cough    2. Nasal congestion    3. Viral URI          ED COURSE & MEDICAL DECISION MAKING:    10:05 AM I introduced myself to patient, performed initial HPI and examination.   11:29 AM Discussed results and plan for discharge      Michael Mo is a 39 year old male who presents to the emergency department today for evaluation of upper respiratory symptoms.      Differential diagnosis includes but is not limited to: pharyngitis, mononucleosis, influenza, sinusitis, allergic rhinitis, peritonsillar abscess, epiglottitis, others.    Not consistent with strep, testing deferred  Unlikely mono without cervical adenopathy and no exudate/edema.  High suspicion for influenza vs COVID vs other viral process, testing ultimately negative  No evidence on exam for peritonsillar abscess.  No drooling or muffled voice to suggest epiglottitis.    Most likely etiology of patients symptoms is viral URI. Nothing to suggest pneumonia, CXR deferred. Instructed on at home supportive management, prescription for tessalon provided. Instructed on red flags/indications to return to the emergency department.       Medical Decision Making  Obtained supplemental history:Supplemental history obtained?: N/A  Reviewed external records: External records reviewed?: No  Care impacted by chronic illness: see above  Care significantly affected by social determinants of health:N/A  Did you consider but not order tests?: Work up considered but not performed and documented in chart, if applicable  Did you interpret images independently?: Independent interpretation of ECG and  images noted in documentation, when applicable.  Consultation discussion with other provider:Did you involve another provider (consultant, , pharmacy, etc.)?: No  Discharge. I prescribed additional prescription strength medication(s) as charted. See documentation for any additional details.  Not Applicable        MEDICATIONS GIVEN IN THE EMERGENCY:  Medications - No data to display    NEW PRESCRIPTIONS STARTED AT TODAY'S ER VISIT  Discharge Medication List as of 3/28/2025 11:21 AM        START taking these medications    Details   benzonatate (TESSALON) 200 MG capsule Take 1 capsule (200 mg) by mouth 3 times daily as needed for cough., Disp-20 capsule, R-0, Local Print                =================================================================    HPI    Patient information was obtained from: Patient    Use of : N/A        Michael Mo is a 39 year old male with a pertinent history of c diff, ulcerative colitis who presents to this ED by private car for evaluation of cough, congestion, subjective fevers.    Reports symptoms started on Monday, subjective fevers.  Endorses nasal congestion, sore/dry throat, productive cough.  No shortness of breath.  No nausea or vomiting.  No diarrhea.  No rashes.     Has been trying Robitussin, honey with tea.    Seeking medical evaluation because he has not been able to cough due to symptoms.    Non-smoker, no history of asthma.         REVIEW OF SYSTEMS   ROS negative unless otherwise stated in HPI    PAST MEDICAL HISTORY:  Past Medical History:   Diagnosis Date    Ulcerative colitis (H)        PAST SURGICAL HISTORY:  Past Surgical History:   Procedure Laterality Date    SIGMOIDOSCOPY FLEXIBLE N/A 10/11/2023    Procedure: SIGMOIDOSCOPY, FLEXIBLE;  Surgeon: Anibal Dillon MD, MD;  Location: [facility] GI       CURRENT MEDICATIONS:    adalimumab (HUMIRA *CF*) 40 MG/0.4ML pen kit  benzonatate (TESSALON) 200 MG capsule  vitamin D2 (ERGOCALCIFEROL) 08139 units (1250  "mcg) capsule        ALLERGIES:  Allergies   Allergen Reactions    Blood-Group Specific Substance      Weak warm autoantibody identified. Expect possible delay in blood for transfusion. Draw at least 2 large lavender tops for type and screen requests.     Nsaids Other (See Comments)     Can't have due to ulcertative colitis.        FAMILY HISTORY:  No family history on file.    SOCIAL HISTORY:   Social History     Socioeconomic History    Marital status: Single   Tobacco Use    Smoking status: Never    Smokeless tobacco: Never     Social Drivers of Health      Received from [employer], [employer] [facility]    Financial Resource Strain    [facility] [employer]    Social Connections       VITALS:  /75   Pulse 85   Temp 97.6  F (36.4  C) (Temporal)   Resp 18   Ht 1.778 m (5' 10\")   Wt 86.2 kg (190 lb)   SpO2 98%   BMI 27.26 kg/m      PHYSICAL EXAM    Constitutional: Well developed, Well nourished, NAD, GCS 15   HENT: Normocephalic, Atraumatic, Oropharynx clear. + Nasal congestion, hoarse voice  Neck- Supple, Nontender. Normal ROM. No stridor.  Eyes: Conjunctiva normal.  Respiratory: No respiratory distress, speaking in full sentences. Normal breath sounds, No wheezing  Cardiovascular: Normal heart rate, Regular rhythm, No murmurs.  Musculoskeletal: No deformities, Moves all extremities equally.   Integument: Warm, Dry, No erythema, ecchymosis, or rash.  Neurologic: Alert & oriented x 3, Normal sensory function. No focal deficits.   Psychiatric: Affect normal, Judgment normal, Mood normal. Cooperative.      LAB:  All pertinent labs reviewed and interpreted.  Results for orders placed or performed during the hospital encounter of 03/28/25   Influenza A/B, RSV and SARS-CoV2 PCR (COVID-19) Nasopharyngeal    Specimen: Nasopharyngeal; Swab   Result Value Ref Range    " Influenza A PCR Negative Negative    Influenza B PCR Negative Negative    RSV PCR Negative Negative    SARS CoV2 PCR Negative Negative       RADIOLOGY:  Reviewed all pertinent imaging. Please see official radiology report.  No orders to display       EKG:    None    PROCEDURES:   None          Elisa Witt PA-C  Emergency Medicine  [facility] EMERGENCY ROOM  [facility] [facility] [address]  [phone]             Elisa Witt PA-C  03/28/25 1140

## 2025-03-28 NOTE — ED TRIAGE NOTES
Pt c.o fever, nasal congestion, body aches, can't sleep due to coughing, coughing up a lot of phlegm. Pt  has not checked temp just feels like has fever.  Took Mucinex today and afrin.

## 2025-08-16 ENCOUNTER — HOSPITAL ENCOUNTER (INPATIENT)
Facility: CLINIC | Age: 40
LOS: 2 days | Discharge: HOME | End: 2025-08-18
Attending: EMERGENCY MEDICINE | Admitting: EMERGENCY MEDICINE
Payer: COMMERCIAL

## 2025-08-16 DIAGNOSIS — K62.5 RECTAL BLEEDING: Primary | ICD-10-CM

## 2025-08-16 DIAGNOSIS — K51.90 EXACERBATION OF ULCERATIVE COLITIS WITHOUT COMPLICATION (H): ICD-10-CM

## 2025-08-16 DIAGNOSIS — K51.911 EXACERBATION OF ULCERATIVE COLITIS WITH RECTAL BLEEDING (H): ICD-10-CM

## 2025-08-16 LAB
ANION GAP SERPL CALCULATED.3IONS-SCNC: 8 MMOL/L (ref 7–15)
BUN SERPL-MCNC: 17.9 MG/DL (ref 6–20)
CALCIUM SERPL-MCNC: 8.6 MG/DL (ref 8.8–10.4)
CHLORIDE SERPL-SCNC: 104 MMOL/L (ref 98–107)
CREAT SERPL-MCNC: 1.34 MG/DL (ref 0.67–1.17)
EGFRCR SERPLBLD CKD-EPI 2021: 70 ML/MIN/1.73M2
GLUCOSE SERPL-MCNC: 98 MG/DL (ref 70–99)
HCO3 SERPL-SCNC: 26 MMOL/L (ref 22–29)
HGB BLD-MCNC: 12.7 G/DL (ref 13.3–17.7)
MCV RBC AUTO: 84.4 FL (ref 78–100)
POTASSIUM SERPL-SCNC: 3.7 MMOL/L (ref 3.4–5.3)
SODIUM SERPL-SCNC: 138 MMOL/L (ref 135–145)

## 2025-08-16 PROCEDURE — 85018 HEMOGLOBIN: CPT | Performed by: EMERGENCY MEDICINE

## 2025-08-16 PROCEDURE — 80048 BASIC METABOLIC PNL TOTAL CA: CPT | Performed by: EMERGENCY MEDICINE

## 2025-08-16 PROCEDURE — 36415 COLL VENOUS BLD VENIPUNCTURE: CPT | Performed by: EMERGENCY MEDICINE

## 2025-08-16 PROCEDURE — 250N000013 HC RX MED GY IP 250 OP 250 PS 637: Performed by: EMERGENCY MEDICINE

## 2025-08-16 PROCEDURE — 99222 1ST HOSP IP/OBS MODERATE 55: CPT | Performed by: EMERGENCY MEDICINE

## 2025-08-16 PROCEDURE — 120N000001 HC R&B MED SURG/OB

## 2025-08-16 PROCEDURE — 250N000011 HC RX IP 250 OP 636: Mod: JW | Performed by: EMERGENCY MEDICINE

## 2025-08-16 PROCEDURE — 258N000003 HC RX IP 258 OP 636: Performed by: EMERGENCY MEDICINE

## 2025-08-16 RX ORDER — NALOXONE HYDROCHLORIDE 0.4 MG/ML
0.2 INJECTION, SOLUTION INTRAMUSCULAR; INTRAVENOUS; SUBCUTANEOUS
Status: DISCONTINUED | OUTPATIENT
Start: 2025-08-16 | End: 2025-08-18 | Stop reason: HOSPADM

## 2025-08-16 RX ORDER — AMOXICILLIN 250 MG
2 CAPSULE ORAL 2 TIMES DAILY PRN
Status: DISCONTINUED | OUTPATIENT
Start: 2025-08-16 | End: 2025-08-18 | Stop reason: HOSPADM

## 2025-08-16 RX ORDER — LIDOCAINE 40 MG/G
CREAM TOPICAL
Status: DISCONTINUED | OUTPATIENT
Start: 2025-08-16 | End: 2025-08-18 | Stop reason: HOSPADM

## 2025-08-16 RX ORDER — INFLIXIMAB 100 MG/10ML
7.5 INJECTION, POWDER, LYOPHILIZED, FOR SOLUTION INTRAVENOUS
COMMUNITY
Start: 2023-10-05

## 2025-08-16 RX ORDER — OXYCODONE HYDROCHLORIDE 5 MG/1
5 TABLET ORAL EVERY 4 HOURS PRN
Refills: 0 | Status: DISCONTINUED | OUTPATIENT
Start: 2025-08-16 | End: 2025-08-18 | Stop reason: HOSPADM

## 2025-08-16 RX ORDER — AMOXICILLIN 250 MG
1 CAPSULE ORAL 2 TIMES DAILY PRN
Status: DISCONTINUED | OUTPATIENT
Start: 2025-08-16 | End: 2025-08-18 | Stop reason: HOSPADM

## 2025-08-16 RX ORDER — CALCIUM CARBONATE 500 MG/1
1000 TABLET, CHEWABLE ORAL 4 TIMES DAILY PRN
Status: DISCONTINUED | OUTPATIENT
Start: 2025-08-16 | End: 2025-08-18 | Stop reason: HOSPADM

## 2025-08-16 RX ORDER — TRAZODONE HYDROCHLORIDE 50 MG/1
50 TABLET ORAL
Status: DISCONTINUED | OUTPATIENT
Start: 2025-08-16 | End: 2025-08-18 | Stop reason: HOSPADM

## 2025-08-16 RX ORDER — SODIUM CHLORIDE, SODIUM LACTATE, POTASSIUM CHLORIDE, CALCIUM CHLORIDE 600; 310; 30; 20 MG/100ML; MG/100ML; MG/100ML; MG/100ML
INJECTION, SOLUTION INTRAVENOUS CONTINUOUS
Status: DISCONTINUED | OUTPATIENT
Start: 2025-08-16 | End: 2025-08-18 | Stop reason: HOSPADM

## 2025-08-16 RX ORDER — NALOXONE HYDROCHLORIDE 0.4 MG/ML
0.4 INJECTION, SOLUTION INTRAMUSCULAR; INTRAVENOUS; SUBCUTANEOUS
Status: DISCONTINUED | OUTPATIENT
Start: 2025-08-16 | End: 2025-08-18 | Stop reason: HOSPADM

## 2025-08-16 RX ORDER — METHYLPREDNISOLONE SODIUM SUCCINATE 40 MG/ML
20 INJECTION INTRAMUSCULAR; INTRAVENOUS EVERY 8 HOURS
Status: DISCONTINUED | OUTPATIENT
Start: 2025-08-16 | End: 2025-08-18 | Stop reason: HOSPADM

## 2025-08-16 RX ORDER — BALSALAZIDE DISODIUM 750 MG/1
2250 CAPSULE ORAL 2 TIMES DAILY
COMMUNITY
Start: 2025-06-02

## 2025-08-16 RX ADMIN — TRAZODONE HYDROCHLORIDE 50 MG: 50 TABLET ORAL at 21:31

## 2025-08-16 RX ADMIN — METHYLPREDNISOLONE SODIUM SUCCINATE 20 MG: 40 INJECTION, POWDER, FOR SOLUTION INTRAMUSCULAR; INTRAVENOUS at 21:06

## 2025-08-16 RX ADMIN — SODIUM CHLORIDE, SODIUM LACTATE, POTASSIUM CHLORIDE, AND CALCIUM CHLORIDE: .6; .31; .03; .02 INJECTION, SOLUTION INTRAVENOUS at 21:04

## 2025-08-16 RX ADMIN — PROCHLORPERAZINE EDISYLATE 5 MG: 5 INJECTION INTRAMUSCULAR; INTRAVENOUS at 21:06

## 2025-08-16 ASSESSMENT — ACTIVITIES OF DAILY LIVING (ADL)
ADLS_ACUITY_SCORE: 30

## 2025-08-17 LAB
CRP SERPL HS-MCNC: 1.06 MG/L
ERYTHROCYTE [DISTWIDTH] IN BLOOD BY AUTOMATED COUNT: 13.2 % (ref 10–15)
HCT VFR BLD AUTO: 33.6 % (ref 40–53)
HGB BLD-MCNC: 11.4 G/DL (ref 13.3–17.7)
MCH RBC QN AUTO: 28.1 PG (ref 26.5–33)
MCHC RBC AUTO-ENTMCNC: 33.9 G/DL (ref 31.5–36.5)
MCV RBC AUTO: 83 FL (ref 78–100)
PLATELET # BLD AUTO: 229 10E3/UL (ref 150–450)
RBC # BLD AUTO: 4.05 10E6/UL (ref 4.4–5.9)
WBC # BLD AUTO: 7.78 10E3/UL (ref 4–11)

## 2025-08-17 PROCEDURE — 36415 COLL VENOUS BLD VENIPUNCTURE: CPT | Performed by: EMERGENCY MEDICINE

## 2025-08-17 PROCEDURE — 258N000003 HC RX IP 258 OP 636: Performed by: EMERGENCY MEDICINE

## 2025-08-17 PROCEDURE — 99232 SBSQ HOSP IP/OBS MODERATE 35: CPT | Performed by: EMERGENCY MEDICINE

## 2025-08-17 PROCEDURE — 250N000013 HC RX MED GY IP 250 OP 250 PS 637: Performed by: PHYSICIAN ASSISTANT

## 2025-08-17 PROCEDURE — 85014 HEMATOCRIT: CPT | Performed by: EMERGENCY MEDICINE

## 2025-08-17 PROCEDURE — 86141 C-REACTIVE PROTEIN HS: CPT | Performed by: EMERGENCY MEDICINE

## 2025-08-17 PROCEDURE — 250N000013 HC RX MED GY IP 250 OP 250 PS 637: Performed by: EMERGENCY MEDICINE

## 2025-08-17 PROCEDURE — 120N000001 HC R&B MED SURG/OB

## 2025-08-17 PROCEDURE — 250N000011 HC RX IP 250 OP 636: Mod: JW | Performed by: EMERGENCY MEDICINE

## 2025-08-17 RX ORDER — ACETAMINOPHEN 325 MG/1
650 TABLET ORAL EVERY 4 HOURS PRN
Status: DISCONTINUED | OUTPATIENT
Start: 2025-08-17 | End: 2025-08-18 | Stop reason: HOSPADM

## 2025-08-17 RX ORDER — MAGNESIUM CARB/ALUMINUM HYDROX 105-160MG
296 TABLET,CHEWABLE ORAL ONCE
Status: COMPLETED | OUTPATIENT
Start: 2025-08-18 | End: 2025-08-18

## 2025-08-17 RX ADMIN — POLYETHYLENE GLYCOL 3350, SODIUM SULFATE ANHYDROUS, SODIUM BICARBONATE, SODIUM CHLORIDE, POTASSIUM CHLORIDE 4000 ML: 236; 22.74; 6.74; 5.86; 2.97 POWDER, FOR SOLUTION ORAL at 17:54

## 2025-08-17 RX ADMIN — METHYLPREDNISOLONE SODIUM SUCCINATE 20 MG: 40 INJECTION, POWDER, FOR SOLUTION INTRAMUSCULAR; INTRAVENOUS at 12:37

## 2025-08-17 RX ADMIN — SODIUM CHLORIDE, SODIUM LACTATE, POTASSIUM CHLORIDE, AND CALCIUM CHLORIDE: .6; .31; .03; .02 INJECTION, SOLUTION INTRAVENOUS at 23:31

## 2025-08-17 RX ADMIN — METHYLPREDNISOLONE SODIUM SUCCINATE 20 MG: 40 INJECTION, POWDER, FOR SOLUTION INTRAMUSCULAR; INTRAVENOUS at 21:39

## 2025-08-17 RX ADMIN — METHYLPREDNISOLONE SODIUM SUCCINATE 20 MG: 40 INJECTION, POWDER, FOR SOLUTION INTRAMUSCULAR; INTRAVENOUS at 05:13

## 2025-08-17 RX ADMIN — OXYCODONE HYDROCHLORIDE 2.5 MG: 5 TABLET ORAL at 21:47

## 2025-08-17 ASSESSMENT — ACTIVITIES OF DAILY LIVING (ADL)
ADLS_ACUITY_SCORE: 30
ADLS_ACUITY_SCORE: 32
ADLS_ACUITY_SCORE: 30
ADLS_ACUITY_SCORE: 30
ADLS_ACUITY_SCORE: 32
ADLS_ACUITY_SCORE: 30
ADLS_ACUITY_SCORE: 30
ADLS_ACUITY_SCORE: 32
ADLS_ACUITY_SCORE: 30
ADLS_ACUITY_SCORE: 32
ADLS_ACUITY_SCORE: 30
ADLS_ACUITY_SCORE: 30
ADLS_ACUITY_SCORE: 32
ADLS_ACUITY_SCORE: 30
ADLS_ACUITY_SCORE: 32
ADLS_ACUITY_SCORE: 30

## 2025-08-18 ENCOUNTER — ANESTHESIA (OUTPATIENT)
Dept: SURGERY | Facility: CLINIC | Age: 40
End: 2025-08-18
Payer: COMMERCIAL

## 2025-08-18 ENCOUNTER — ANESTHESIA EVENT (OUTPATIENT)
Dept: SURGERY | Facility: CLINIC | Age: 40
End: 2025-08-18
Payer: COMMERCIAL

## 2025-08-18 VITALS
HEIGHT: 69 IN | HEART RATE: 66 BPM | BODY MASS INDEX: 28.14 KG/M2 | RESPIRATION RATE: 18 BRPM | DIASTOLIC BLOOD PRESSURE: 59 MMHG | WEIGHT: 190 LBS | SYSTOLIC BLOOD PRESSURE: 114 MMHG | TEMPERATURE: 97.2 F | OXYGEN SATURATION: 99 %

## 2025-08-18 LAB
ANION GAP SERPL CALCULATED.3IONS-SCNC: 9 MMOL/L (ref 7–15)
BUN SERPL-MCNC: 13 MG/DL (ref 6–20)
CALCIUM SERPL-MCNC: 8.3 MG/DL (ref 8.8–10.4)
CHLORIDE SERPL-SCNC: 106 MMOL/L (ref 98–107)
COLONOSCOPY: NORMAL
CREAT SERPL-MCNC: 1.39 MG/DL (ref 0.67–1.17)
EGFRCR SERPLBLD CKD-EPI 2021: 67 ML/MIN/1.73M2
GLUCOSE SERPL-MCNC: 138 MG/DL (ref 70–99)
HCO3 SERPL-SCNC: 25 MMOL/L (ref 22–29)
HGB BLD-MCNC: 8.9 G/DL (ref 13.3–17.7)
MCV RBC AUTO: 83.7 FL (ref 78–100)
POTASSIUM SERPL-SCNC: 3.9 MMOL/L (ref 3.4–5.3)
SODIUM SERPL-SCNC: 140 MMOL/L (ref 135–145)

## 2025-08-18 PROCEDURE — 88342 IMHCHEM/IMCYTCHM 1ST ANTB: CPT | Mod: 26 | Performed by: STUDENT IN AN ORGANIZED HEALTH CARE EDUCATION/TRAINING PROGRAM

## 2025-08-18 PROCEDURE — 272N000001 HC OR GENERAL SUPPLY STERILE: Performed by: INTERNAL MEDICINE

## 2025-08-18 PROCEDURE — 370N000017 HC ANESTHESIA TECHNICAL FEE, PER MIN: Performed by: INTERNAL MEDICINE

## 2025-08-18 PROCEDURE — 99238 HOSP IP/OBS DSCHRG MGMT 30/<: CPT | Performed by: EMERGENCY MEDICINE

## 2025-08-18 PROCEDURE — 360N000075 HC SURGERY LEVEL 2, PER MIN: Performed by: INTERNAL MEDICINE

## 2025-08-18 PROCEDURE — 0DBN8ZZ EXCISION OF SIGMOID COLON, VIA NATURAL OR ARTIFICIAL OPENING ENDOSCOPIC: ICD-10-PCS | Performed by: INTERNAL MEDICINE

## 2025-08-18 PROCEDURE — 999N000141 HC STATISTIC PRE-PROCEDURE NURSING ASSESSMENT: Performed by: INTERNAL MEDICINE

## 2025-08-18 PROCEDURE — 85018 HEMOGLOBIN: CPT | Performed by: EMERGENCY MEDICINE

## 2025-08-18 PROCEDURE — 0DBP8ZX EXCISION OF RECTUM, VIA NATURAL OR ARTIFICIAL OPENING ENDOSCOPIC, DIAGNOSTIC: ICD-10-PCS | Performed by: INTERNAL MEDICINE

## 2025-08-18 PROCEDURE — 88305 TISSUE EXAM BY PATHOLOGIST: CPT | Mod: 26 | Performed by: STUDENT IN AN ORGANIZED HEALTH CARE EDUCATION/TRAINING PROGRAM

## 2025-08-18 PROCEDURE — 250N000011 HC RX IP 250 OP 636: Performed by: NURSE ANESTHETIST, CERTIFIED REGISTERED

## 2025-08-18 PROCEDURE — 0DBF8ZX EXCISION OF RIGHT LARGE INTESTINE, VIA NATURAL OR ARTIFICIAL OPENING ENDOSCOPIC, DIAGNOSTIC: ICD-10-PCS | Performed by: INTERNAL MEDICINE

## 2025-08-18 PROCEDURE — 250N000013 HC RX MED GY IP 250 OP 250 PS 637: Performed by: PHYSICIAN ASSISTANT

## 2025-08-18 PROCEDURE — 258N000003 HC RX IP 258 OP 636: Performed by: NURSE ANESTHETIST, CERTIFIED REGISTERED

## 2025-08-18 PROCEDURE — 250N000009 HC RX 250: Performed by: NURSE ANESTHETIST, CERTIFIED REGISTERED

## 2025-08-18 PROCEDURE — 0DBG8ZX EXCISION OF LEFT LARGE INTESTINE, VIA NATURAL OR ARTIFICIAL OPENING ENDOSCOPIC, DIAGNOSTIC: ICD-10-PCS | Performed by: INTERNAL MEDICINE

## 2025-08-18 PROCEDURE — 250N000011 HC RX IP 250 OP 636: Mod: JW | Performed by: EMERGENCY MEDICINE

## 2025-08-18 PROCEDURE — 36415 COLL VENOUS BLD VENIPUNCTURE: CPT | Performed by: EMERGENCY MEDICINE

## 2025-08-18 PROCEDURE — 80048 BASIC METABOLIC PNL TOTAL CA: CPT | Performed by: EMERGENCY MEDICINE

## 2025-08-18 PROCEDURE — 258N000003 HC RX IP 258 OP 636: Performed by: ANESTHESIOLOGY

## 2025-08-18 PROCEDURE — 710N000010 HC RECOVERY PHASE 1, LEVEL 2, PER MIN: Performed by: INTERNAL MEDICINE

## 2025-08-18 PROCEDURE — 88342 IMHCHEM/IMCYTCHM 1ST ANTB: CPT | Mod: TC | Performed by: INTERNAL MEDICINE

## 2025-08-18 RX ORDER — HYDROMORPHONE HCL IN WATER/PF 6 MG/30 ML
0.4 PATIENT CONTROLLED ANALGESIA SYRINGE INTRAVENOUS EVERY 5 MIN PRN
Refills: 0 | Status: DISCONTINUED | OUTPATIENT
Start: 2025-08-18 | End: 2025-08-18 | Stop reason: HOSPADM

## 2025-08-18 RX ORDER — OXYCODONE HYDROCHLORIDE 5 MG/1
5 TABLET ORAL
Status: DISCONTINUED | OUTPATIENT
Start: 2025-08-18 | End: 2025-08-18 | Stop reason: HOSPADM

## 2025-08-18 RX ORDER — FLUMAZENIL 0.1 MG/ML
0.2 INJECTION, SOLUTION INTRAVENOUS
Status: DISCONTINUED | OUTPATIENT
Start: 2025-08-18 | End: 2025-08-18 | Stop reason: HOSPADM

## 2025-08-18 RX ORDER — PROPOFOL 10 MG/ML
INJECTION, EMULSION INTRAVENOUS CONTINUOUS PRN
Status: DISCONTINUED | OUTPATIENT
Start: 2025-08-18 | End: 2025-08-18

## 2025-08-18 RX ORDER — ONDANSETRON 2 MG/ML
INJECTION INTRAMUSCULAR; INTRAVENOUS PRN
Status: DISCONTINUED | OUTPATIENT
Start: 2025-08-18 | End: 2025-08-18

## 2025-08-18 RX ORDER — ONDANSETRON 2 MG/ML
4 INJECTION INTRAMUSCULAR; INTRAVENOUS EVERY 30 MIN PRN
Status: DISCONTINUED | OUTPATIENT
Start: 2025-08-18 | End: 2025-08-18 | Stop reason: HOSPADM

## 2025-08-18 RX ORDER — PREDNISONE 20 MG/1
TABLET ORAL
Qty: 21 TABLET | Refills: 0 | Status: SHIPPED | OUTPATIENT
Start: 2025-08-18 | End: 2025-09-08

## 2025-08-18 RX ORDER — SODIUM CHLORIDE, SODIUM LACTATE, POTASSIUM CHLORIDE, CALCIUM CHLORIDE 600; 310; 30; 20 MG/100ML; MG/100ML; MG/100ML; MG/100ML
INJECTION, SOLUTION INTRAVENOUS CONTINUOUS
Status: DISCONTINUED | OUTPATIENT
Start: 2025-08-18 | End: 2025-08-18 | Stop reason: HOSPADM

## 2025-08-18 RX ORDER — DIMENHYDRINATE 50 MG/ML
25 INJECTION, SOLUTION INTRAMUSCULAR; INTRAVENOUS
Status: DISCONTINUED | OUTPATIENT
Start: 2025-08-18 | End: 2025-08-18 | Stop reason: HOSPADM

## 2025-08-18 RX ORDER — LIDOCAINE 40 MG/G
CREAM TOPICAL
Status: DISCONTINUED | OUTPATIENT
Start: 2025-08-18 | End: 2025-08-18 | Stop reason: HOSPADM

## 2025-08-18 RX ORDER — OXYCODONE HYDROCHLORIDE 5 MG/1
10 TABLET ORAL
Status: DISCONTINUED | OUTPATIENT
Start: 2025-08-18 | End: 2025-08-18 | Stop reason: HOSPADM

## 2025-08-18 RX ORDER — FENTANYL CITRATE 50 UG/ML
25 INJECTION, SOLUTION INTRAMUSCULAR; INTRAVENOUS EVERY 5 MIN PRN
Refills: 0 | Status: DISCONTINUED | OUTPATIENT
Start: 2025-08-18 | End: 2025-08-18 | Stop reason: HOSPADM

## 2025-08-18 RX ORDER — HALOPERIDOL 5 MG/ML
1 INJECTION INTRAMUSCULAR
Status: DISCONTINUED | OUTPATIENT
Start: 2025-08-18 | End: 2025-08-18 | Stop reason: HOSPADM

## 2025-08-18 RX ORDER — HYDROMORPHONE HCL IN WATER/PF 6 MG/30 ML
0.2 PATIENT CONTROLLED ANALGESIA SYRINGE INTRAVENOUS EVERY 5 MIN PRN
Refills: 0 | Status: DISCONTINUED | OUTPATIENT
Start: 2025-08-18 | End: 2025-08-18 | Stop reason: HOSPADM

## 2025-08-18 RX ORDER — DEXAMETHASONE SODIUM PHOSPHATE 4 MG/ML
4 INJECTION, SOLUTION INTRA-ARTICULAR; INTRALESIONAL; INTRAMUSCULAR; INTRAVENOUS; SOFT TISSUE
Status: DISCONTINUED | OUTPATIENT
Start: 2025-08-18 | End: 2025-08-18 | Stop reason: HOSPADM

## 2025-08-18 RX ORDER — ONDANSETRON 4 MG/1
4 TABLET, ORALLY DISINTEGRATING ORAL EVERY 30 MIN PRN
Status: DISCONTINUED | OUTPATIENT
Start: 2025-08-18 | End: 2025-08-18 | Stop reason: HOSPADM

## 2025-08-18 RX ORDER — NALOXONE HYDROCHLORIDE 0.4 MG/ML
0.1 INJECTION, SOLUTION INTRAMUSCULAR; INTRAVENOUS; SUBCUTANEOUS
Status: DISCONTINUED | OUTPATIENT
Start: 2025-08-18 | End: 2025-08-18 | Stop reason: HOSPADM

## 2025-08-18 RX ORDER — FENTANYL CITRATE 50 UG/ML
50 INJECTION, SOLUTION INTRAMUSCULAR; INTRAVENOUS EVERY 5 MIN PRN
Refills: 0 | Status: DISCONTINUED | OUTPATIENT
Start: 2025-08-18 | End: 2025-08-18 | Stop reason: HOSPADM

## 2025-08-18 RX ORDER — FENTANYL CITRATE 50 UG/ML
25 INJECTION, SOLUTION INTRAMUSCULAR; INTRAVENOUS
Status: DISCONTINUED | OUTPATIENT
Start: 2025-08-18 | End: 2025-08-18 | Stop reason: HOSPADM

## 2025-08-18 RX ORDER — LIDOCAINE HYDROCHLORIDE 10 MG/ML
INJECTION, SOLUTION INFILTRATION; PERINEURAL PRN
Status: DISCONTINUED | OUTPATIENT
Start: 2025-08-18 | End: 2025-08-18

## 2025-08-18 RX ADMIN — PHENYLEPHRINE HYDROCHLORIDE 200 MCG: 10 INJECTION INTRAVENOUS at 11:57

## 2025-08-18 RX ADMIN — ONDANSETRON 4 MG: 2 INJECTION INTRAMUSCULAR; INTRAVENOUS at 11:47

## 2025-08-18 RX ADMIN — PROPOFOL 50 MG: 10 INJECTION, EMULSION INTRAVENOUS at 11:38

## 2025-08-18 RX ADMIN — METHYLPREDNISOLONE SODIUM SUCCINATE 20 MG: 40 INJECTION, POWDER, FOR SOLUTION INTRAMUSCULAR; INTRAVENOUS at 06:45

## 2025-08-18 RX ADMIN — LIDOCAINE HYDROCHLORIDE 50 MG: 10 INJECTION, SOLUTION INFILTRATION; PERINEURAL at 11:35

## 2025-08-18 RX ADMIN — SODIUM CHLORIDE, SODIUM LACTATE, POTASSIUM CHLORIDE, AND CALCIUM CHLORIDE: .6; .31; .03; .02 INJECTION, SOLUTION INTRAVENOUS at 12:15

## 2025-08-18 RX ADMIN — PROPOFOL 50 MG: 10 INJECTION, EMULSION INTRAVENOUS at 11:37

## 2025-08-18 RX ADMIN — METHYLPREDNISOLONE SODIUM SUCCINATE 20 MG: 40 INJECTION, POWDER, FOR SOLUTION INTRAMUSCULAR; INTRAVENOUS at 13:19

## 2025-08-18 RX ADMIN — PROPOFOL 150 MCG/KG/MIN: 10 INJECTION, EMULSION INTRAVENOUS at 11:34

## 2025-08-18 RX ADMIN — MAJOR MAGNESIUM CITRATE ORAL SOLUTION - LEMON 296 ML: 1.75 LIQUID ORAL at 06:45

## 2025-08-18 ASSESSMENT — ACTIVITIES OF DAILY LIVING (ADL)
ADLS_ACUITY_SCORE: 32
ADLS_ACUITY_SCORE: 33
ADLS_ACUITY_SCORE: 32

## 2025-08-20 LAB
PATH REPORT.ADDENDUM SPEC: NORMAL
PATH REPORT.COMMENTS IMP SPEC: NORMAL
PATH REPORT.FINAL DX SPEC: NORMAL
PATH REPORT.GROSS SPEC: NORMAL
PATH REPORT.MICROSCOPIC SPEC OTHER STN: NORMAL
PATH REPORT.RELEVANT HX SPEC: NORMAL
PHOTO IMAGE: NORMAL

## (undated) DEVICE — SYR 03ML LL W/O NDL 309657

## (undated) DEVICE — TUBING SUCTION MEDI-VAC 1/4"X20' N620A

## (undated) DEVICE — TUBING ENDOGATOR + H2O PORT CO

## (undated) DEVICE — SOLUTION WATER 1000ML BOTTLE R5000-01

## (undated) DEVICE — SUCTION CANISTER 1000ML 65651-510

## (undated) DEVICE — KIT CONNECTOR FOR OLYMPUS ENDOSCOPES DEFENDO 100310

## (undated) DEVICE — FORCEP BIOPSY 2.3MM DISP COATED 000388

## (undated) DEVICE — KIT SCOPE CLEANING ENDOSCOPY BX00719705

## (undated) DEVICE — SOL WATER IRRIG 500ML BOTTLE 2F7113

## (undated) DEVICE — ENDO SNARE EXACTO COLD 9MM LOOP 2.4MMX230CM 00711115

## (undated) DEVICE — SUCTION MANIFOLD NEPTUNE 2 SYS 1 PORT 702-025-000

## (undated) RX ORDER — ONDANSETRON 2 MG/ML
INJECTION INTRAMUSCULAR; INTRAVENOUS
Status: DISPENSED
Start: 2025-08-18

## (undated) RX ORDER — LIDOCAINE HYDROCHLORIDE 10 MG/ML
INJECTION, SOLUTION EPIDURAL; INFILTRATION; INTRACAUDAL; PERINEURAL
Status: DISPENSED
Start: 2025-08-18